# Patient Record
Sex: FEMALE | Race: ASIAN | Employment: OTHER | URBAN - METROPOLITAN AREA
[De-identification: names, ages, dates, MRNs, and addresses within clinical notes are randomized per-mention and may not be internally consistent; named-entity substitution may affect disease eponyms.]

---

## 2017-01-13 ENCOUNTER — TELEPHONE (OUTPATIENT)
Dept: FAMILY MEDICINE CLINIC | Facility: CLINIC | Age: 62
End: 2017-01-13

## 2017-01-13 NOTE — TELEPHONE ENCOUNTER
Patient called and would like to see Dr Ny Scales as patient now has insurance. Patient stated she hasn't been here since 9/2016 due to being self pay. Patient would like to speak with nurse before scheduling her appt for med refills. Please contact patient.

## 2017-01-16 ENCOUNTER — TELEPHONE (OUTPATIENT)
Dept: FAMILY MEDICINE CLINIC | Facility: CLINIC | Age: 62
End: 2017-01-16

## 2017-01-16 NOTE — TELEPHONE ENCOUNTER
Patient called and left message on call, called patient and spoke to her, states would like to make an appointment as she has new insurance and would like to get her labs done before her appointment. Advised to call the office on Monday.

## 2017-01-16 NOTE — TELEPHONE ENCOUNTER
Patient had labs ordered at [facility] cancelled can reorder when we  know insurance. Patient has appointment with new PCP that new PCP can order any labs wanted.      Future Appointments  Date Time Provider Clifton Burch   1/16/2017 3:30 PM BRIANNA Alonzo

## 2017-01-16 NOTE — TELEPHONE ENCOUNTER
Pt said she spoke to Dr Jarred Martel and was advised to speak with a nurse to get blood work order. [facility]. (Changed listing in chart). Pls call pt and confirm order.

## 2017-01-20 ENCOUNTER — TELEPHONE (OUTPATIENT)
Dept: FAMILY MEDICINE CLINIC | Facility: CLINIC | Age: 62
End: 2017-01-20

## 2017-01-20 DIAGNOSIS — I10 ESSENTIAL HYPERTENSION: ICD-10-CM

## 2017-01-20 DIAGNOSIS — E78.00 PURE HYPERCHOLESTEROLEMIA: Primary | ICD-10-CM

## 2017-01-20 NOTE — TELEPHONE ENCOUNTER
Wants to have lab order put in. Asked to speak to Dr CONTRERAS's nurse - quite insistent. Does not want to come in, wants labs done prior to appt -- I explained labs are normally ordered at the time of the appt. .   Labs at [place], [place].    I did change LAB in pt

## 2017-01-20 NOTE — TELEPHONE ENCOUNTER
Patient called today and LVM asking to speak with nurse, pt did call office twice during meeting hour and LVM both times needs to speak with nurse regarding call she made earlier today.

## 2017-01-20 NOTE — TELEPHONE ENCOUNTER
No future appointments. Future Appointments  Date Time Provider Clifton Kiersten   2/22/2017 11:30 AM Kayce Clayton MD [facility] [address]     Patient aware of labs being ordered.

## 2017-02-05 DIAGNOSIS — I10 ESSENTIAL HYPERTENSION WITH GOAL BLOOD PRESSURE LESS THAN 140/90: Primary | ICD-10-CM

## 2017-02-06 NOTE — TELEPHONE ENCOUNTER
Future Appointments  Return in about 6 months (around 3/27/2017).   Date Time Provider Clifton Kiersten   2/22/2017 11:30 AM Hadley Causey MD [facility] [address]     LOV 9/16     LAST LAB 10/15    LAST RX  Metoprolol Succinate ER 25 MG Oral Tablet 24 Hr

## 2017-02-13 ENCOUNTER — TELEPHONE (OUTPATIENT)
Dept: FAMILY MEDICINE CLINIC | Facility: CLINIC | Age: 62
End: 2017-02-13

## 2017-02-13 NOTE — TELEPHONE ENCOUNTER
Returned patient's call and let her know labs have been ordered and she can go to [facility].  She asked hours of [facility] and I advised her to call them.

## 2017-02-15 LAB
ALBUMIN/GLOBULIN RATIO: 1.3 (CALC) (ref 1–2.5)
ALBUMIN: 4.1 G/DL (ref 3.6–5.1)
ALKALINE PHOSPHATASE: 69 U/L (ref 33–130)
ALT: 18 U/L (ref 6–29)
AST: 19 U/L (ref 10–35)
BILIRUBIN, TOTAL: 0.3 MG/DL (ref 0.2–1.2)
BUN/CREATININE RATIO: 8 (CALC) (ref 6–22)
BUN: 8 MG/DL (ref 7–25)
CALCIUM: 9.6 MG/DL (ref 8.6–10.4)
CARBON DIOXIDE: 26 MMOL/L (ref 20–31)
CHLORIDE: 108 MMOL/L (ref 98–110)
CHOL/HDLC RATIO: 2.5 (CALC)
CHOLESTEROL, TOTAL: 141 MG/DL (ref 125–200)
CREATININE: 1.03 MG/DL (ref 0.5–0.99)
EGFR IF AFRICN AM: 67 ML/MIN/1.73M2
EGFR IF NONAFRICN AM: 58 ML/MIN/1.73M2
GLOBULIN: 3.2 G/DL (CALC) (ref 1.9–3.7)
GLUCOSE: 98 MG/DL (ref 65–99)
HDL CHOLESTEROL: 56 MG/DL
LDL-CHOLESTEROL: 66 MG/DL (CALC)
NON-HDL CHOLESTEROL: 85 MG/DL (CALC)
POTASSIUM: 4.9 MMOL/L (ref 3.5–5.3)
PROTEIN, TOTAL: 7.3 G/DL (ref 6.1–8.1)
SODIUM: 141 MMOL/L (ref 135–146)
TRIGLYCERIDES: 95 MG/DL

## 2017-02-20 ENCOUNTER — OFFICE VISIT (OUTPATIENT)
Dept: FAMILY MEDICINE CLINIC | Facility: CLINIC | Age: 62
End: 2017-02-20

## 2017-02-20 VITALS
TEMPERATURE: 98 F | RESPIRATION RATE: 16 BRPM | BODY MASS INDEX: 24.77 KG/M2 | HEIGHT: 58 IN | DIASTOLIC BLOOD PRESSURE: 72 MMHG | HEART RATE: 88 BPM | SYSTOLIC BLOOD PRESSURE: 118 MMHG | WEIGHT: 118 LBS

## 2017-02-20 DIAGNOSIS — N18.2 CHRONIC KIDNEY DISEASE, STAGE 2 (MILD): ICD-10-CM

## 2017-02-20 DIAGNOSIS — E78.00 PURE HYPERCHOLESTEROLEMIA: ICD-10-CM

## 2017-02-20 DIAGNOSIS — I10 ESSENTIAL HYPERTENSION WITH GOAL BLOOD PRESSURE LESS THAN 140/90: ICD-10-CM

## 2017-02-20 DIAGNOSIS — Z79.899 ENCOUNTER FOR LONG-TERM CURRENT USE OF MEDICATION: Primary | ICD-10-CM

## 2017-02-20 DIAGNOSIS — E55.9 VITAMIN D DEFICIENCY: ICD-10-CM

## 2017-02-20 PROBLEM — N18.9 CHRONIC KIDNEY DISEASE: Status: ACTIVE | Noted: 2017-02-20

## 2017-02-20 PROCEDURE — 99213 OFFICE O/P EST LOW 20 MIN: CPT | Performed by: FAMILY MEDICINE

## 2017-02-20 RX ORDER — LOSARTAN POTASSIUM AND HYDROCHLOROTHIAZIDE 12.5; 1 MG/1; MG/1
1 TABLET ORAL
Qty: 90 TABLET | Refills: 1 | Status: SHIPPED | OUTPATIENT
Start: 2017-02-20 | End: 2017-09-07

## 2017-02-20 RX ORDER — ROSUVASTATIN CALCIUM 10 MG/1
10 TABLET, COATED ORAL NIGHTLY
Qty: 90 TABLET | Refills: 1 | Status: SHIPPED | OUTPATIENT
Start: 2017-02-20 | End: 2017-04-03 | Stop reason: ALTCHOICE

## 2017-02-20 NOTE — PATIENT INSTRUCTIONS
Chronic Kidney Disease (CKD)    The role of the kidneys is to remove waste products and extra water from the blood.  When the kidneys do not work as they should, waste products begin to build up in the blood. This is called chronic kidney disease (CKD). · If you smoke, you must quit. Smoking makes kidney disease worse. Talk with your healthcare provider about ways to help you quit.  For more information, visit the following links:  ¨ www.smokefree.gov/sites/default/files/pdf/clearing-the-air-accessible. pd · Chest pain or shortness of breath  · Heart beating fast, slow, or irregularly  When to seek medical advice  Call your healthcare provider right away if any of these occur:  · Nausea or vomiting  · Fever of 100.4°F (38°C) or higher, or as directed by your

## 2017-02-27 NOTE — PROGRESS NOTES
Sara Pollard is a [place] 43year old female. Patient presents with: Follow - Up: Lab results. HPI:   HTN: patient is seen for follow up and medication refill, states has been compliant with low salt diet and medication.  Denies dizziness, blurred vision, palp edema  Component      Latest Ref Rng 2/14/2017   GLUCOSE      65 - 99 mg/dL 98   BUN      7 - 25 mg/dL 8   CREATININE      0.50 - 0.99 mg/dL 1.03 (H)   eGFR NON-AFR.  AMERICAN      > OR = 60 mL/min/1.73m2 58 (L)   EGFR IF AFRICN AM      > OR = 60 mL/min/1.7

## 2017-03-27 ENCOUNTER — TELEPHONE (OUTPATIENT)
Dept: FAMILY MEDICINE CLINIC | Facility: CLINIC | Age: 62
End: 2017-03-27

## 2017-03-27 DIAGNOSIS — E78.00 PURE HYPERCHOLESTEROLEMIA: Primary | ICD-10-CM

## 2017-04-03 RX ORDER — ATORVASTATIN CALCIUM 10 MG/1
10 TABLET, FILM COATED ORAL NIGHTLY
Qty: 90 TABLET | Refills: 0 | Status: SHIPPED | OUTPATIENT
Start: 2017-04-03 | End: 2017-04-03

## 2017-04-03 NOTE — TELEPHONE ENCOUNTER
Patient called asking to speak with Dr Lashawn Holbrook in regards to her Crestor. I explained that she's seeing patients today. She said she must speak with her because she speaks her language.   I suggested she go through the triage nurse first.  Transferred to

## 2017-04-03 NOTE — TELEPHONE ENCOUNTER
Rx sent. Spoke to patient she says she had muscle pains with another Rx. But cant remember. She will ask her spouse.     2014 Hyperlipidemia: patient states since she was changed from crestor to atorvastatin has pain in her left shoulder back and chest area

## 2017-04-03 NOTE — TELEPHONE ENCOUNTER
Prior auth for generic crestor denied by [employer], patient states was told by then we can do prior auth for the brand.

## 2017-04-04 NOTE — TELEPHONE ENCOUNTER
Patient called wants her Rx doesn't want to wait any longer . Advised can pay out of pocket and if approved can be reimbursed. Says she does not want to pay.

## 2017-04-10 ENCOUNTER — TELEPHONE (OUTPATIENT)
Dept: FAMILY MEDICINE CLINIC | Facility: CLINIC | Age: 62
End: 2017-04-10

## 2017-04-10 DIAGNOSIS — I10 ESSENTIAL HYPERTENSION WITH GOAL BLOOD PRESSURE LESS THAN 140/90: Primary | ICD-10-CM

## 2017-04-10 RX ORDER — ROSUVASTATIN CALCIUM 10 MG/1
10 TABLET, FILM COATED ORAL NIGHTLY
Qty: 30 TABLET | Refills: 2 | Status: SHIPPED | OUTPATIENT
Start: 2017-04-10 | End: 2017-09-07

## 2017-04-10 NOTE — TELEPHONE ENCOUNTER
Pharmacy is still giving her Generic of Cestor and she cannot take that. Sent to Triage . Asked to speak with Dr Mendez Albert.

## 2017-09-07 ENCOUNTER — OFFICE VISIT (OUTPATIENT)
Dept: FAMILY MEDICINE CLINIC | Facility: CLINIC | Age: 62
End: 2017-09-07

## 2017-09-07 VITALS
RESPIRATION RATE: 14 BRPM | HEIGHT: 58.5 IN | DIASTOLIC BLOOD PRESSURE: 64 MMHG | SYSTOLIC BLOOD PRESSURE: 118 MMHG | WEIGHT: 120.25 LBS | BODY MASS INDEX: 24.57 KG/M2 | OXYGEN SATURATION: 99 % | HEART RATE: 58 BPM | TEMPERATURE: 98 F

## 2017-09-07 DIAGNOSIS — I10 ESSENTIAL HYPERTENSION WITH GOAL BLOOD PRESSURE LESS THAN 140/90: ICD-10-CM

## 2017-09-07 DIAGNOSIS — Z79.899 ENCOUNTER FOR LONG-TERM (CURRENT) USE OF MEDICATIONS: Primary | ICD-10-CM

## 2017-09-07 DIAGNOSIS — E78.00 PURE HYPERCHOLESTEROLEMIA: ICD-10-CM

## 2017-09-07 PROCEDURE — 99213 OFFICE O/P EST LOW 20 MIN: CPT | Performed by: FAMILY MEDICINE

## 2017-09-07 RX ORDER — LOSARTAN POTASSIUM AND HYDROCHLOROTHIAZIDE 12.5; 1 MG/1; MG/1
1 TABLET ORAL
Qty: 90 TABLET | Refills: 1 | Status: SHIPPED | OUTPATIENT
Start: 2017-09-07 | End: 2018-02-26

## 2017-09-07 RX ORDER — ROSUVASTATIN CALCIUM 10 MG/1
10 TABLET, FILM COATED ORAL NIGHTLY
Qty: 90 TABLET | Refills: 1 | Status: SHIPPED | OUTPATIENT
Start: 2017-09-07 | End: 2018-02-26

## 2017-09-07 NOTE — PROGRESS NOTES
Zi Pace is a 60year old female. Patient presents with:  Medication Follow-Up: 3 month f/u. HPI:   HTN: patient is seen for follow up and medication refill, states has been compliant with low salt diet and medication.  Denies dizziness, bl adenopathy,no bruits  LUNGS: clear to auscultation  CARDIO: RRR without murmur  GI: good BS's,no masses, HSM or tenderness  EXTREMITIES: no cyanosis, clubbing or edema    Component      Latest Ref Rng & Units 2/14/2017   GLUCOSE      65 - 99 mg/dL 98   BUN nightly. -     LIPID PANEL; Future  -     LIPID PANEL  Encouraged to continue low cholesterol diet.

## 2017-11-10 ENCOUNTER — TELEPHONE (OUTPATIENT)
Dept: FAMILY MEDICINE CLINIC | Facility: CLINIC | Age: 62
End: 2017-11-10

## 2017-11-10 NOTE — TELEPHONE ENCOUNTER
**LEFT VM AT FRONT OFFICE**    Left message that she has questions regarding labs and might need to see Dr Shaji Dupree. Pls call.

## 2017-11-22 ENCOUNTER — OFFICE VISIT (OUTPATIENT)
Dept: FAMILY MEDICINE CLINIC | Facility: CLINIC | Age: 62
End: 2017-11-22

## 2017-11-22 VITALS
SYSTOLIC BLOOD PRESSURE: 108 MMHG | BODY MASS INDEX: 24.42 KG/M2 | WEIGHT: 121.13 LBS | HEIGHT: 59 IN | TEMPERATURE: 98 F | RESPIRATION RATE: 16 BRPM | HEART RATE: 64 BPM | OXYGEN SATURATION: 98 % | DIASTOLIC BLOOD PRESSURE: 62 MMHG

## 2017-11-22 DIAGNOSIS — E78.00 PURE HYPERCHOLESTEROLEMIA: ICD-10-CM

## 2017-11-22 DIAGNOSIS — R73.01 IMPAIRED FASTING GLUCOSE: Primary | ICD-10-CM

## 2017-11-22 DIAGNOSIS — I12.9 HYPERTENSIVE CKD (CHRONIC KIDNEY DISEASE): ICD-10-CM

## 2017-11-22 DIAGNOSIS — I10 ESSENTIAL HYPERTENSION: ICD-10-CM

## 2017-11-22 PROCEDURE — 99213 OFFICE O/P EST LOW 20 MIN: CPT | Performed by: FAMILY MEDICINE

## 2017-11-22 NOTE — PATIENT INSTRUCTIONS
Please shelley for a refill in 3 months and I will see you for a follow up in march      Understanding Carbohydrates    A car needs the right type of fuel to run. And you need the right kind of food to function.  To keep your energy level up, your body needs fo around 45 to 60 grams of carbohydrate per meal, depending on your situation. Carb counting is a system that helps you keep track of the carbohydrates you eat at each meal.  Carbohydrates come from a variety of foods.  These include grains, starchy vegetable and total carbohydrates to find the products that work best for you.   · Don't forget protein and fat. With all the focus on carb counting, it might be easy to forget protein and fat in your meals.  Don't forget to include sources of protein and healthy fat

## 2017-11-22 NOTE — PROGRESS NOTES
Milton Naidu is a 60year old female. Patient presents with:  Test Results: Discuss tests results. HPI:   Patient is seen for follow up and to discuss her labs. Would like an updated note for work with restrictions.  patient states has back a Latest Ref Rng & Units 11/14/2017   GLUCOSE      65 - 99 mg/dL 100 (H)   BUN      7 - 25 mg/dL 11   CREATININE      0.50 - 0.99 mg/dL 1.05 (H)   eGFR NON-AFR.  AMERICAN      > OR = 60 mL/min/1.73m2 56 (L)   EGFR IF AFRICN AM      > OR = 60 mL/min/1.73m2 65

## 2017-12-29 NOTE — LETTER
"              After Visit Summary   2017    Neela Michel    MRN: [id number]           Patient Information     Date Of Birth          1994        Visit Information        Provider Department      2017 10:30 AM NA FP NURSE [facility]        Today's Diagnoses     Encounter for other contraceptive management           Follow-ups after your visit        Who to contact     If you have questions or need follow up information about today's clinic visit or your schedule please contact [facility] directly at [phone].  Normal or non-critical lab and imaging results will be communicated to you by [employer], letter or phone within 4 business days after the clinic has received the results. If you do not hear from us within 7 days, please contact the clinic through [employer] or phone. If you have a critical or abnormal lab result, we will notify you by phone as soon as possible.  Submit refill requests through [employer] or call your pharmacy and they will forward the refill request to us. Please allow 3 business days for your refill to be completed.          Additional Information About Your Visit        MyChart Information     [employer] lets you send messages to your doctor, view your test results, renew your prescriptions, schedule appointments and more. To sign up, go to [email or web address] . Click on \"Log in\" on the left side of the screen, which will take you to the Welcome page. Then click on \"Sign up Now\" on the right side of the page.     You will be asked to enter the access code listed below, as well as some personal information. Please follow the directions to create your username and password.     Your access code is: [email or web address]  Expires: 1/3/2018  2:42 PM     Your access code will  in 90 days. If you need help or a new code, please call your [facility] or [phone].        Care EveryWhere ID     This is your Care EveryWhere ID. This could be used by other " 04/05/18        [address] [place] [address],[address]  [facility] [address]      Dear Andrew ,    Our records indicate that you have outstanding lab work and or testing that was ordered for you and has not yet been completed:          BASIC METABOLIC PANEL [062 organizations to access your [place] medical records  [id number]         Blood Pressure from Last 3 Encounters:   10/18/16 94/62   03/22/16 114/84   07/31/15 108/74    Weight from Last 3 Encounters:   10/18/16 133 lb (60.3 kg)   03/22/16 126 lb (57.2 kg)   07/31/15 128 lb 6.4 oz (58.2 kg)              We Performed the Following     THER/PROPH/DIAG INJ, SC/IM          Where to get your medicines      Some of these will need a paper prescription and others can be bought over the counter.  Ask your nurse if you have questions.     You don't need a prescription for these medications     medroxyPROGESTERone 150 MG/ML injection          Primary Care Provider Office Phone # Fax #    CANDY Sinclair [phone]       [facility] [place] [address]  [place] [address]        Equal Access to Services     GRICEL LOPEZ : Hadii analia ku hadasho Soomaali, waaxda luqadaha, qaybta kaalmada adeegyada, claire zamudio . So [facility] [phone].    ATENCIÓN: Si habla español, tiene a osuna disposición servicios gratuitos de asistencia lingüística. Llmiguel al [phone].    We comply with applicable federal civil rights laws and [place] laws. We do not discriminate on the basis of race, color, national origin, age, disability, sex, sexual orientation, or gender identity.            Thank you!     Thank you for choosing [facility]  for your care. Our goal is always to provide you with excellent care. Hearing back from our patients is one way we can continue to improve our services. Please take a few minutes to complete the written survey that you may receive in the mail after your visit with us. Thank you!             Your Updated Medication List - Protect others around you: Learn how to safely use, store and throw away your medicines at www.disposemymeds.org.          This list is accurate as of: 12/29/17 11:09 AM.  Always use your most recent med list.                    Brand Name Dispense Instructions for use Diagnosis    IBUPROFEN PO      Take 400 mg by mouth every 8 hours as needed for moderate pain        loratadine 10 MG tablet    CLARITIN    30 tablet    Take 1 tablet (10 mg) by mouth daily    Seasonal allergic rhinitis, unspecified allergic rhinitis trigger       medroxyPROGESTERone 150 MG/ML injection    DEPO-PROVERA    0.9 mL    Inject 1 mL (150 mg) into the muscle every 3 months    Encounter for other contraceptive management

## 2018-02-26 DIAGNOSIS — I10 ESSENTIAL HYPERTENSION WITH GOAL BLOOD PRESSURE LESS THAN 140/90: ICD-10-CM

## 2018-02-26 DIAGNOSIS — E78.00 PURE HYPERCHOLESTEROLEMIA: ICD-10-CM

## 2018-02-26 RX ORDER — LOSARTAN POTASSIUM AND HYDROCHLOROTHIAZIDE 12.5; 1 MG/1; MG/1
TABLET ORAL
Qty: 30 TABLET | Refills: 1 | Status: SHIPPED | OUTPATIENT
Start: 2018-02-26 | End: 2018-03-06

## 2018-02-26 RX ORDER — ROSUVASTATIN CALCIUM 10 MG/1
TABLET, FILM COATED ORAL
Qty: 90 TABLET | Refills: 1 | Status: SHIPPED | OUTPATIENT
Start: 2018-02-26 | End: 2018-03-06

## 2018-02-26 NOTE — TELEPHONE ENCOUNTER
Future Appointments  Date Time Provider Clifton Kiersten   4/11/2018 12:00 PM Bert Valerio MD [facility] [address]       LOV 11/17    LAST LAB 11/17    LAST RX   Losartan Potassium-HCTZ 100-12.5 MG Oral Tab 90 tablet 1 9/7/2017       PROTOCOL    David

## 2018-03-02 LAB
ALBUMIN/GLOBULIN RATIO: 1.5 (CALC) (ref 1–2.5)
ALBUMIN: 4.2 G/DL (ref 3.6–5.1)
ALKALINE PHOSPHATASE: 75 U/L (ref 33–130)
ALT: 18 U/L (ref 6–29)
AST: 20 U/L (ref 10–35)
BILIRUBIN, TOTAL: 0.6 MG/DL (ref 0.2–1.2)
BUN/CREATININE RATIO: 16 (CALC) (ref 6–22)
BUN: 19 MG/DL (ref 7–25)
CALCIUM: 9.4 MG/DL (ref 8.6–10.4)
CARBON DIOXIDE: 26 MMOL/L (ref 20–31)
CHLORIDE: 91 MMOL/L (ref 98–110)
CHOL/HDLC RATIO: 3 (CALC)
CHOLESTEROL, TOTAL: 158 MG/DL
CREATININE: 1.17 MG/DL (ref 0.5–0.99)
EGFR IF AFRICN AM: 57 ML/MIN/1.73M2
EGFR IF NONAFRICN AM: 49 ML/MIN/1.73M2
GLOBULIN: 2.8 G/DL (CALC) (ref 1.9–3.7)
GLUCOSE: 99 MG/DL (ref 65–99)
HDL CHOLESTEROL: 53 MG/DL
HEMOGLOBIN A1C: 6 % OF TOTAL HGB
LDL-CHOLESTEROL: 79 MG/DL (CALC)
NON-HDL CHOLESTEROL: 105 MG/DL (CALC)
POTASSIUM: 4.7 MMOL/L (ref 3.5–5.3)
PROTEIN, TOTAL: 7 G/DL (ref 6.1–8.1)
SODIUM: 125 MMOL/L (ref 135–146)
TRIGLYCERIDES: 154 MG/DL

## 2018-03-06 ENCOUNTER — OFFICE VISIT (OUTPATIENT)
Dept: FAMILY MEDICINE CLINIC | Facility: CLINIC | Age: 63
End: 2018-03-06

## 2018-03-06 ENCOUNTER — TELEPHONE (OUTPATIENT)
Dept: FAMILY MEDICINE CLINIC | Facility: CLINIC | Age: 63
End: 2018-03-06

## 2018-03-06 VITALS
HEART RATE: 56 BPM | DIASTOLIC BLOOD PRESSURE: 74 MMHG | RESPIRATION RATE: 16 BRPM | HEIGHT: 58 IN | TEMPERATURE: 98 F | BODY MASS INDEX: 25.3 KG/M2 | SYSTOLIC BLOOD PRESSURE: 132 MMHG | WEIGHT: 120.5 LBS | OXYGEN SATURATION: 98 %

## 2018-03-06 DIAGNOSIS — N18.2 HYPERTENSIVE KIDNEY DISEASE WITH STAGE 2 CHRONIC KIDNEY DISEASE: ICD-10-CM

## 2018-03-06 DIAGNOSIS — R00.1 BRADYCARDIA: ICD-10-CM

## 2018-03-06 DIAGNOSIS — Z79.899 ENCOUNTER FOR LONG-TERM (CURRENT) USE OF MEDICATIONS: Primary | ICD-10-CM

## 2018-03-06 DIAGNOSIS — I10 ESSENTIAL HYPERTENSION: ICD-10-CM

## 2018-03-06 DIAGNOSIS — E78.00 PURE HYPERCHOLESTEROLEMIA: ICD-10-CM

## 2018-03-06 DIAGNOSIS — E87.1 HYPONATREMIA: ICD-10-CM

## 2018-03-06 DIAGNOSIS — R00.2 PALPITATIONS: ICD-10-CM

## 2018-03-06 DIAGNOSIS — R73.03 PREDIABETES: ICD-10-CM

## 2018-03-06 DIAGNOSIS — I12.9 HYPERTENSIVE KIDNEY DISEASE WITH STAGE 2 CHRONIC KIDNEY DISEASE: ICD-10-CM

## 2018-03-06 PROCEDURE — 99214 OFFICE O/P EST MOD 30 MIN: CPT | Performed by: FAMILY MEDICINE

## 2018-03-06 PROCEDURE — 93000 ELECTROCARDIOGRAM COMPLETE: CPT | Performed by: FAMILY MEDICINE

## 2018-03-06 RX ORDER — LOSARTAN POTASSIUM AND HYDROCHLOROTHIAZIDE 12.5; 1 MG/1; MG/1
1 TABLET ORAL
Qty: 90 TABLET | Refills: 1 | Status: SHIPPED | OUTPATIENT
Start: 2018-03-06 | End: 2018-07-11

## 2018-03-06 RX ORDER — ROSUVASTATIN CALCIUM 10 MG/1
TABLET, FILM COATED ORAL
Qty: 90 TABLET | Refills: 1 | Status: SHIPPED | OUTPATIENT
Start: 2018-03-06 | End: 2018-07-11

## 2018-03-06 NOTE — PATIENT INSTRUCTIONS
Please get the ECHO done  Continue to monitor your blood pressure at home. If you continue to have palpitations with symptoms will order a Holter or refer you to cardiology.     Please make sure you drink water before your next blood test.    Sodium and ch · Cut back on how much salt you get in your diet. Here’s how to do this:  ¨ Don’t eat foods that have a lot of salt. These include olives, pickles, smoked meats, and salted potato chips. ¨ Don’t add salt to your food at the table.   ¨ Use only small amount The American Heart Association recommends the following guidelines for home blood pressure monitoring:  · Don't smoke or drink coffee for 30 minutes before taking your blood pressure.   · Go to the bathroom before the test.  · Relax for 5 minutes before heather · Throbbing or rushing sound in the ears  · Nosebleed  · Sudden severe pain in your belly (abdomen)  · Extreme drowsiness, confusion, or fainting  · Dizziness or spinning sensation (vertigo)  · Weakness of an arm or leg or one side of the face  · You have

## 2018-03-06 NOTE — PROGRESS NOTES
Richard Pickens is a 58year old female. Patient presents with:  Test Results: Patient here to discuss labs    HPI:   HTN: Patient is seen for follow-up and medication refill, compliant with medication.   States blood pressure has been running high 14 Smokeless tobacco: Never Used                      Alcohol use:  No                 REVIEW OF SYSTEMS:   GENERAL HEALTH: denies fatigue  SKIN: denies any unusual skin lesions or rashes  RESPIRATORY: denies shortness of breath with e % of total Hgb 6.0 (H)     ASSESSMENT AND PLAN:   Diagnoses and all orders for this visit:    Encounter for long-term (current) use of medications    Pure hypercholesterolemia  -     CRESTOR 10 MG Oral Tab; TAKE ONE TABLET BY MOUTH NIGHTLY  -     LIPID PAN

## 2018-03-08 NOTE — TELEPHONE ENCOUNTER
**PT LVM AT FRONT OFFICE**    Stated that her [employer] is telling her she needs Prior Auth for medication and she would like a Nurse to call her.     [phone]

## 2018-03-12 ENCOUNTER — HOSPITAL ENCOUNTER (OUTPATIENT)
Dept: CV DIAGNOSTICS | Facility: HOSPITAL | Age: 63
Discharge: HOME | End: 2018-03-12
Attending: FAMILY MEDICINE
Payer: COMMERCIAL

## 2018-03-12 ENCOUNTER — TELEPHONE (OUTPATIENT)
Dept: FAMILY MEDICINE CLINIC | Facility: CLINIC | Age: 63
End: 2018-03-12

## 2018-03-12 DIAGNOSIS — R00.1 BRADYCARDIA: ICD-10-CM

## 2018-03-12 DIAGNOSIS — R00.2 PALPITATIONS: ICD-10-CM

## 2018-03-12 NOTE — TELEPHONE ENCOUNTER
Patient needs Crestor/brand. Due to feet and back pain on generic.      Prior auth done wait for approval or denial.

## 2018-03-16 NOTE — TELEPHONE ENCOUNTER
Pt called back stating [facility] is telling her the the price is $110    Advised to call her insurance to see what the Preferred pharmacy is, as it may be cheaper

## 2018-04-04 ENCOUNTER — HOSPITAL ENCOUNTER (OUTPATIENT)
Dept: CV DIAGNOSTICS | Facility: HOSPITAL | Age: 63
Discharge: HOME | End: 2018-04-04
Attending: FAMILY MEDICINE
Payer: COMMERCIAL

## 2018-04-04 PROCEDURE — 93306 TTE W/DOPPLER COMPLETE: CPT | Performed by: FAMILY MEDICINE

## 2018-06-21 ENCOUNTER — TELEPHONE (OUTPATIENT)
Dept: FAMILY MEDICINE CLINIC | Facility: CLINIC | Age: 63
End: 2018-06-21

## 2018-06-21 NOTE — TELEPHONE ENCOUNTER
Has a medication question. No other details. Asked to speak with Dr Gloria Chew directly. Explained she needs to leave a message with the Triage Nurse.

## 2018-06-21 NOTE — TELEPHONE ENCOUNTER
Called and spoke to patient, states will not have insurance after July and will not have insurance until January. Advised to make an appointment in July to discuss refills.

## 2018-06-21 NOTE — TELEPHONE ENCOUNTER
Spoke with pt, she is being laid off at [employer] and is losing her insurance. Pt has one more 90 day Rx on her medications that she was able to refill but is worried about what she can do now.     Pt wanted to know if she should do labs agan while she has i

## 2018-07-11 ENCOUNTER — OFFICE VISIT (OUTPATIENT)
Dept: FAMILY MEDICINE CLINIC | Facility: CLINIC | Age: 63
End: 2018-07-11

## 2018-07-11 VITALS
SYSTOLIC BLOOD PRESSURE: 122 MMHG | DIASTOLIC BLOOD PRESSURE: 74 MMHG | HEART RATE: 68 BPM | WEIGHT: 121.13 LBS | TEMPERATURE: 98 F | BODY MASS INDEX: 24.75 KG/M2 | RESPIRATION RATE: 16 BRPM | HEIGHT: 58.5 IN

## 2018-07-11 DIAGNOSIS — G89.29 CHRONIC PAIN OF RIGHT KNEE: ICD-10-CM

## 2018-07-11 DIAGNOSIS — I10 ESSENTIAL HYPERTENSION: ICD-10-CM

## 2018-07-11 DIAGNOSIS — Z79.899 ENCOUNTER FOR LONG-TERM CURRENT USE OF MEDICATION: Primary | ICD-10-CM

## 2018-07-11 DIAGNOSIS — R00.2 PALPITATIONS: ICD-10-CM

## 2018-07-11 DIAGNOSIS — E78.00 PURE HYPERCHOLESTEROLEMIA: ICD-10-CM

## 2018-07-11 DIAGNOSIS — M25.561 CHRONIC PAIN OF RIGHT KNEE: ICD-10-CM

## 2018-07-11 PROCEDURE — 99214 OFFICE O/P EST MOD 30 MIN: CPT | Performed by: FAMILY MEDICINE

## 2018-07-11 RX ORDER — LOSARTAN POTASSIUM AND HYDROCHLOROTHIAZIDE 12.5; 1 MG/1; MG/1
1 TABLET ORAL
Qty: 90 TABLET | Refills: 1 | Status: SHIPPED | OUTPATIENT
Start: 2018-07-11 | End: 2018-11-07

## 2018-07-11 RX ORDER — ROSUVASTATIN CALCIUM 10 MG/1
TABLET, FILM COATED ORAL
Qty: 90 TABLET | Refills: 1 | Status: SHIPPED | OUTPATIENT
Start: 2018-07-11 | End: 2019-01-07

## 2018-07-23 NOTE — PROGRESS NOTES
Iona Vasquez is a 58year old female. Patient presents with: Follow - Up: Pt here to see provider before she loses her insurance. HPI:   HTN: Patient is seen for follow-up and medication refill, compliant with medication and low salt diet.  Alvino Avilez are clear  NECK: supple,no adenopathy,no bruits  LUNGS: clear to auscultation  CARDIO: RRR without murmur  KNEE: right, no swelling, no tenderness to palpation along the joint line, mild crepitus, normal ROM  EXTREMITIES: no cyanosis, clubbing or edema

## 2018-09-04 DIAGNOSIS — R00.2 PALPITATIONS: ICD-10-CM

## 2018-09-04 DIAGNOSIS — E78.00 PURE HYPERCHOLESTEROLEMIA: ICD-10-CM

## 2018-09-04 DIAGNOSIS — I10 ESSENTIAL HYPERTENSION: ICD-10-CM

## 2018-09-05 RX ORDER — LOSARTAN POTASSIUM AND HYDROCHLOROTHIAZIDE 12.5; 1 MG/1; MG/1
1 TABLET ORAL
Qty: 90 TABLET | Refills: 1 | OUTPATIENT
Start: 2018-09-05

## 2018-09-05 RX ORDER — ROSUVASTATIN CALCIUM 10 MG/1
TABLET, FILM COATED ORAL
Qty: 90 TABLET | Refills: 1 | OUTPATIENT
Start: 2018-09-05

## 2018-09-05 NOTE — TELEPHONE ENCOUNTER
Losartan Potassium-HCTZ 100-12.5 MG Oral Tab  Take 1 tablet by mouth once daily.        Disp: 90 tablet Refills: 1    Class: Normal Start: 9/4/2018   For: Essential hypertension  Originally ordered: 3 years ago by Reji Carvajal MD  Hypertension Medicati

## 2018-09-13 NOTE — TELEPHONE ENCOUNTER
[facility] mail service pharmacy called to verify patient's prescriptions as listed below:  Losartan  Crestor  Metprolol    Patient is requesting further Rx be sent to mail order pharmacy.

## 2018-09-24 DIAGNOSIS — E78.00 PURE HYPERCHOLESTEROLEMIA: ICD-10-CM

## 2018-09-24 DIAGNOSIS — I10 ESSENTIAL HYPERTENSION: ICD-10-CM

## 2018-09-24 DIAGNOSIS — R00.2 PALPITATIONS: ICD-10-CM

## 2018-09-25 RX ORDER — LOSARTAN POTASSIUM AND HYDROCHLOROTHIAZIDE 12.5; 1 MG/1; MG/1
TABLET ORAL
Qty: 30 TABLET | Refills: 0 | OUTPATIENT
Start: 2018-09-25

## 2018-09-25 RX ORDER — ROSUVASTATIN CALCIUM 10 MG/1
TABLET, COATED ORAL
Qty: 30 TABLET | Refills: 0 | OUTPATIENT
Start: 2018-09-25

## 2018-09-25 NOTE — TELEPHONE ENCOUNTER
Name from pharmacy: Losartan Potassium-HCTZ Oral Tablet 100-12.5 MG          Will file in chart as: LOSARTAN POTASSIUM-HCTZ 100-12.5 MG Oral Tab    The source prescription was reordered on 7/11/2018 by Skip Oliveira MD.    Sig: TAKE ONE TABLET BY DOROTHY TAKE ONE TABLET BY MOUTH ONE TIME DAILY     Disp:  30 tablet (Pharmacy requested: 30)    Refills:  0    Start: 9/24/2018     Class: Normal    For: Palpitations    Requested on: 3/6/2018    Last ordered: 6 months ago by Tory Forrester MD Last refill: 8/2

## 2018-10-30 ENCOUNTER — TELEPHONE (OUTPATIENT)
Dept: FAMILY MEDICINE CLINIC | Facility: CLINIC | Age: 63
End: 2018-10-30

## 2018-10-30 NOTE — TELEPHONE ENCOUNTER
Patient called and stated Spencer Camacho would like to speak with Dr Bessie Cabrera. in her language\". Declined to schedule an Appt. Appt offered. Said \"Dr CONTRERAS told her to call with questions and she would call her back\".

## 2018-10-31 NOTE — TELEPHONE ENCOUNTER
Called and spoke to patient, states has a cold for 2 days, will be travelling in November, does no have insurance and wants to know if she can still be seen if not better. Told patient will be charged as self pay and can absolutely be seen.   Will call for

## 2018-11-07 ENCOUNTER — TELEPHONE (OUTPATIENT)
Dept: FAMILY MEDICINE CLINIC | Facility: CLINIC | Age: 63
End: 2018-11-07

## 2018-11-07 DIAGNOSIS — I10 ESSENTIAL HYPERTENSION: ICD-10-CM

## 2018-11-07 RX ORDER — LOSARTAN POTASSIUM AND HYDROCHLOROTHIAZIDE 12.5; 1 MG/1; MG/1
1 TABLET ORAL
Qty: 90 TABLET | Refills: 1 | Status: CANCELLED | OUTPATIENT
Start: 2018-11-07

## 2018-11-07 RX ORDER — LOSARTAN POTASSIUM AND HYDROCHLOROTHIAZIDE 12.5; 1 MG/1; MG/1
1 TABLET ORAL
Qty: 30 TABLET | Refills: 0 | Status: SHIPPED | OUTPATIENT
Start: 2018-11-07 | End: 2018-11-09

## 2018-11-07 NOTE — TELEPHONE ENCOUNTER
Patient says her mail order says the script was sent as Losartan 100/25 mg but that did not come from PCP office. Says she cant afford [place] script and wants a letter. Says she has no insurance now. Needs Rx as going to [place] in 2 days.      Wants to clyde

## 2018-11-07 NOTE — TELEPHONE ENCOUNTER
Patient called is questioning her Losartan dose. Says she got Losartan 100/25 mg is on 12.5.     Potassium-HCTZ (HYZAAR) 100-25 MG Oral Tab (Discontinued)  4 8/11/2014 9/     Losartan Potassium-HCTZ 100-12.5 MG Oral Tab 90 tablet 1 7/11/2018     HUANG

## 2018-11-09 RX ORDER — LOSARTAN POTASSIUM AND HYDROCHLOROTHIAZIDE 12.5; 1 MG/1; MG/1
1 TABLET ORAL
Qty: 90 TABLET | Refills: 1 | Status: SHIPPED | OUTPATIENT
Start: 2018-11-09 | End: 2019-02-08

## 2018-11-09 NOTE — TELEPHONE ENCOUNTER
[facility]. There was a miscommunication on original order for Losartan Potassium-HCTZ. The dose was entered as 100/25 instead of 100/12. 5. Corrected Rx sent to [facility] and they said they would replace with correct dose.     Patient notif

## 2018-11-09 NOTE — TELEPHONE ENCOUNTER
I'm not sure mail order will cover her prescription as she does not have insurance, cannot send a 90 day prescription as she has not been seen for a while. She should have had another refill from her July prescription.  She is not due for refill until [facility]

## 2018-11-14 ENCOUNTER — TELEPHONE (OUTPATIENT)
Dept: FAMILY MEDICINE CLINIC | Facility: CLINIC | Age: 63
End: 2018-11-14

## 2018-11-14 NOTE — TELEPHONE ENCOUNTER
Spoke to patient she does not want different RX at this time. Advised that if needed can see a dr in [facility] or see her PCP when back.

## 2018-11-14 NOTE — TELEPHONE ENCOUNTER
Patient called and states she still has not received the updated Rx for Losartan-HCTZ 100-12.5 mg from [facility]. Called [facility]- Unable to verify where the error was made, on the office side or on the Pharmacy side since the Rx was called in on 9/13/18.

## 2018-11-14 NOTE — TELEPHONE ENCOUNTER
Lisinopril HCTZ 20-12.5 is on the $4 list, I usually do not like to change medication if the patient is travelling, please advise patient to come in and see me to discuss her medication, it will be a self pay appointment.

## 2019-01-07 ENCOUNTER — OFFICE VISIT (OUTPATIENT)
Dept: FAMILY MEDICINE CLINIC | Facility: CLINIC | Age: 64
End: 2019-01-07
Payer: MEDICARE

## 2019-01-07 VITALS
BODY MASS INDEX: 24 KG/M2 | SYSTOLIC BLOOD PRESSURE: 118 MMHG | DIASTOLIC BLOOD PRESSURE: 62 MMHG | OXYGEN SATURATION: 98 % | HEART RATE: 64 BPM | WEIGHT: 117.25 LBS | RESPIRATION RATE: 16 BRPM | TEMPERATURE: 98 F

## 2019-01-07 DIAGNOSIS — J40 BRONCHITIS: ICD-10-CM

## 2019-01-07 DIAGNOSIS — R00.2 PALPITATIONS: ICD-10-CM

## 2019-01-07 DIAGNOSIS — I10 ESSENTIAL HYPERTENSION: ICD-10-CM

## 2019-01-07 DIAGNOSIS — E78.00 PURE HYPERCHOLESTEROLEMIA: ICD-10-CM

## 2019-01-07 DIAGNOSIS — J06.9 ACUTE URI: Primary | ICD-10-CM

## 2019-01-07 PROCEDURE — 99213 OFFICE O/P EST LOW 20 MIN: CPT | Performed by: FAMILY MEDICINE

## 2019-01-07 RX ORDER — ROSUVASTATIN CALCIUM 10 MG/1
TABLET, FILM COATED ORAL
Qty: 90 TABLET | Refills: 0 | Status: SHIPPED | OUTPATIENT
Start: 2019-01-07 | End: 2019-02-06

## 2019-01-07 RX ORDER — BENZONATATE 200 MG/1
200 CAPSULE ORAL 3 TIMES DAILY PRN
Qty: 21 CAPSULE | Refills: 0 | Status: SHIPPED | OUTPATIENT
Start: 2019-01-07 | End: 2019-01-14

## 2019-01-07 NOTE — PATIENT INSTRUCTIONS
mucinex 600 mg 2 times daily      Acute Bronchitis  Your healthcare provider has told you that you have acute bronchitis. Bronchitis is infection or inflammation of the bronchial tubes (airways in the lungs).  Normally, air moves easily in and out of the ai infections. · Drink plenty of fluids, such as water, juice, or warm soup. Fluids loosen mucus so that you can cough it up. This helps you breathe more easily. Fluids also prevent dehydration. · Make sure you get plenty of rest.  · Do not smoke.  Do not al

## 2019-01-07 NOTE — PROGRESS NOTES
Serenity Gar is a 71year old female. Patient presents with:  Cough:  One week with cold and cough    HPI:   Patient complaining of URI symptoms for the past 1 week, states nasal congestion and sore throat are better and fever has resolved but cont good BS's,no masses, HSM or tenderness  EXTREMITIES: no cyanosis, clubbing or edema    ASSESSMENT AND PLAN:   Gloria was seen today for cough. Diagnoses and all orders for this visit:    Acute URI  Comfort care discussed with patient.     Bronchitis  -

## 2019-02-06 DIAGNOSIS — I10 ESSENTIAL HYPERTENSION: ICD-10-CM

## 2019-02-06 DIAGNOSIS — R00.2 PALPITATIONS: ICD-10-CM

## 2019-02-06 DIAGNOSIS — E78.00 PURE HYPERCHOLESTEROLEMIA: ICD-10-CM

## 2019-02-06 NOTE — TELEPHONE ENCOUNTER
Patient needs Rx sent [facility]. Already ordered at local pharmacy.

## 2019-02-08 RX ORDER — LOSARTAN POTASSIUM AND HYDROCHLOROTHIAZIDE 12.5; 1 MG/1; MG/1
1 TABLET ORAL
Qty: 90 TABLET | Refills: 0 | Status: SHIPPED | OUTPATIENT
Start: 2019-02-08 | End: 2019-04-17

## 2019-02-08 RX ORDER — ROSUVASTATIN CALCIUM 10 MG/1
TABLET, FILM COATED ORAL
Qty: 90 TABLET | Refills: 0 | Status: SHIPPED | OUTPATIENT
Start: 2019-02-08 | End: 2019-02-13 | Stop reason: ALTCHOICE

## 2019-02-13 RX ORDER — ROSUVASTATIN CALCIUM 10 MG/1
10 TABLET, COATED ORAL NIGHTLY
Qty: 90 TABLET | Refills: 0 | Status: SHIPPED | OUTPATIENT
Start: 2019-02-13 | End: 2019-04-17

## 2019-03-22 ENCOUNTER — OFFICE VISIT (OUTPATIENT)
Dept: FAMILY MEDICINE CLINIC | Facility: CLINIC | Age: 64
End: 2019-03-22
Payer: MEDICARE

## 2019-03-22 VITALS
SYSTOLIC BLOOD PRESSURE: 128 MMHG | RESPIRATION RATE: 16 BRPM | WEIGHT: 119 LBS | TEMPERATURE: 98 F | DIASTOLIC BLOOD PRESSURE: 74 MMHG | HEART RATE: 74 BPM | HEIGHT: 59 IN | BODY MASS INDEX: 23.99 KG/M2 | OXYGEN SATURATION: 99 %

## 2019-03-22 DIAGNOSIS — R01.1 CARDIAC MURMUR: ICD-10-CM

## 2019-03-22 DIAGNOSIS — J06.9 UPPER RESPIRATORY TRACT INFECTION, UNSPECIFIED TYPE: Primary | ICD-10-CM

## 2019-03-22 PROBLEM — N18.30 CKD (CHRONIC KIDNEY DISEASE) STAGE 3, GFR 30-59 ML/MIN (HCC): Chronic | Status: ACTIVE | Noted: 2019-03-22

## 2019-03-22 PROCEDURE — 99202 OFFICE O/P NEW SF 15 MIN: CPT | Performed by: NURSE PRACTITIONER

## 2019-03-22 NOTE — PATIENT INSTRUCTIONS
Take plain Mucinex/guaifenesin 600 mg per box instructions    Viral Upper Respiratory Illness (Adult)  You have a viral upper respiratory illness (URI), which is another term for the common cold. This illness is contagious during the first few days.  It i When to seek medical advice  Call your healthcare provider right away if any of these occur:  · Cough with lots of colored sputum (mucus)  · Severe headache; face, neck, or ear pain  · Difficulty swallowing due to throat pain  · Fever of 100.4°F (38°C) or Heart murmurs do not usually cause symptoms. They tend to be found when your healthcare provider is listening to your heart for another reason. People with an abnormal heart murmur may have symptoms of the problem causing the murmur.  Symptoms can include: © 8471-2810 [employer] [address]. [address] [facility], [address] [place]. All rights reserved. This information is not intended as a substitute for professional medical care. Always follow your healthcare professional's instructions.     Patient ins

## 2019-03-22 NOTE — PROGRESS NOTES
CHIEF COMPLAINT:   Patient presents with:  URI      HPI:   Kerri Oh is a 71year old female who presents with URI symptoms for  4 days. Onset was gradual, Location is mid face, ears and throat. Symptoms are present.  Described as nasal pressure, GI: patient denies N/V/C or abdominal pain  MUSCULOSKELETAL: denies any osseous, soft tissue, or joint complaints. NEURO: denies headaches, numbness, abnormal sensation,  or change in balance.   Patient reports with confidence all of her chronic medical c PLAN: Meds as below. Comfort care as described in Patient Instructions.     URI  Patient instructed to consider acetaminophen per box instructions for pain and fever,  consider OTC guaifenesin per box instructions - patient reports she has taken Mucinex re · You may use acetaminophen or ibuprofen to control pain and fever, unless another medicine was prescribed. If you have chronic liver or kidney disease, have ever had a stomach ulcer or gastrointestinal bleeding, or are taking blood-thinning medicines, clyde The heart makes sounds as it beats. These sounds occur as the heart valves open and close to allow blood to flow through the heart. A heart murmur is an extra noise heard during a heartbeat.  The noise is caused when blood does not flow smoothly through the · Procedures or surgery to fix or replace a diseased or damaged heart valve  · Procedures or surgery to fix a hole in the heart  What are the complications of a heart murmur? An innocent heart murmur has no complications.  Complications of an abnormal hear

## 2019-04-08 ENCOUNTER — TELEPHONE (OUTPATIENT)
Dept: FAMILY MEDICINE CLINIC | Facility: CLINIC | Age: 64
End: 2019-04-08

## 2019-04-15 ENCOUNTER — TELEPHONE (OUTPATIENT)
Dept: FAMILY MEDICINE CLINIC | Facility: CLINIC | Age: 64
End: 2019-04-15

## 2019-04-15 NOTE — TELEPHONE ENCOUNTER
Called patient. States she is getting low on all of her medications. Advised it has been over a year since she has had labs done. She needs appt. To follow up with Dr. Bandar Parr. Appt scheduled for this week.     Future Appointments   Date Time Provider Dep

## 2019-04-17 ENCOUNTER — OFFICE VISIT (OUTPATIENT)
Dept: FAMILY MEDICINE CLINIC | Facility: CLINIC | Age: 64
End: 2019-04-17
Payer: MEDICARE

## 2019-04-17 VITALS
RESPIRATION RATE: 14 BRPM | HEART RATE: 64 BPM | SYSTOLIC BLOOD PRESSURE: 132 MMHG | DIASTOLIC BLOOD PRESSURE: 68 MMHG | OXYGEN SATURATION: 99 % | BODY MASS INDEX: 25 KG/M2 | TEMPERATURE: 99 F | WEIGHT: 122.25 LBS

## 2019-04-17 DIAGNOSIS — R05.9 COUGH: ICD-10-CM

## 2019-04-17 DIAGNOSIS — E78.00 PURE HYPERCHOLESTEROLEMIA: Primary | ICD-10-CM

## 2019-04-17 DIAGNOSIS — I10 ESSENTIAL HYPERTENSION: ICD-10-CM

## 2019-04-17 DIAGNOSIS — R73.03 PREDIABETES: ICD-10-CM

## 2019-04-17 DIAGNOSIS — R00.2 PALPITATIONS: ICD-10-CM

## 2019-04-17 DIAGNOSIS — N18.30 CKD (CHRONIC KIDNEY DISEASE) STAGE 3, GFR 30-59 ML/MIN (HCC): Chronic | ICD-10-CM

## 2019-04-17 PROCEDURE — 99214 OFFICE O/P EST MOD 30 MIN: CPT | Performed by: FAMILY MEDICINE

## 2019-04-17 RX ORDER — LOSARTAN POTASSIUM AND HYDROCHLOROTHIAZIDE 12.5; 1 MG/1; MG/1
1 TABLET ORAL
Qty: 90 TABLET | Refills: 1 | Status: SHIPPED | OUTPATIENT
Start: 2019-04-17 | End: 2019-12-02

## 2019-04-17 RX ORDER — ROSUVASTATIN CALCIUM 10 MG/1
10 TABLET, COATED ORAL NIGHTLY
Qty: 90 TABLET | Refills: 1 | Status: SHIPPED | OUTPATIENT
Start: 2019-04-17 | End: 2019-12-02

## 2019-04-17 NOTE — PROGRESS NOTES
Akshat Russo is a 71year old female.   Patient presents with:  Medication Follow-Up: Review meds and refill  Cough: Pt has had cough for more then a month may have allergies not sure    HPI:   HTN:sheri is seen for follow up and medication refill well nourished,in no apparent distress  SKIN: no rashes,no suspicious lesions  HEENT: atraumatic, normocephalic,ears and throat are clear  NECK: supple,no adenopathy,no bruits  LUNGS: clear to auscultation, no wheezing, rhonchi or rales  CARDIO: RRR withou 100-12.5 MG Oral Tab; Take 1 tablet by mouth once daily.  -     COMP METABOLIC PANEL (14);  Future  -     COMP METABOLIC PANEL (14)    Prediabetes controlled  -     HEMOGLOBIN A1C; Future  -     HEMOGLOBIN A1C    CKD (chronic kidney disease) stage 3, GFR 30

## 2019-04-17 NOTE — PATIENT INSTRUCTIONS
Lifestyle Changes to Control Cholesterol  You can control your cholesterol through diet, exercise, weight management, quitting smoking, stress management, and taking your medicines right. These things can also lower your risk for cardiovascular disease. · Riding a bicycle or stationary bike  · Dancing  Managing your weight  If you are overweight or obese, your healthcare provider will work with you to help you lose weight and lower your BMI (body mass index).  Making diet changes and getting more physical · Don’t skip a dose or stop taking your medicine because you feel better or because your cholesterol numbers go down. Never stop taking your medicine unless your healthcare provider has told you it’s OK.   · Ask your healthcare provider if you have any ques © 1347-4803 [employer] [address]. [address] [facility], [facility] [place]. All rights reserved. This information is not intended as a substitute for professional medical care. Always follow your healthcare professional's instructions.

## 2019-07-02 ENCOUNTER — OFFICE VISIT (OUTPATIENT)
Dept: FAMILY MEDICINE CLINIC | Facility: CLINIC | Age: 64
End: 2019-07-02
Payer: MEDICARE

## 2019-07-02 VITALS
WEIGHT: 122.44 LBS | BODY MASS INDEX: 25.7 KG/M2 | DIASTOLIC BLOOD PRESSURE: 78 MMHG | TEMPERATURE: 98 F | RESPIRATION RATE: 16 BRPM | SYSTOLIC BLOOD PRESSURE: 122 MMHG | OXYGEN SATURATION: 98 % | HEART RATE: 61 BPM | HEIGHT: 58 IN

## 2019-07-02 DIAGNOSIS — Z00.00 ENCOUNTER FOR ANNUAL HEALTH EXAMINATION: Primary | ICD-10-CM

## 2019-07-02 DIAGNOSIS — I10 ESSENTIAL HYPERTENSION: ICD-10-CM

## 2019-07-02 DIAGNOSIS — Z12.39 SCREENING FOR BREAST CANCER: ICD-10-CM

## 2019-07-02 DIAGNOSIS — Z23 NEED FOR VACCINATION: ICD-10-CM

## 2019-07-02 DIAGNOSIS — R73.03 PREDIABETES: ICD-10-CM

## 2019-07-02 DIAGNOSIS — N18.30 CKD (CHRONIC KIDNEY DISEASE) STAGE 3, GFR 30-59 ML/MIN (HCC): Chronic | ICD-10-CM

## 2019-07-02 DIAGNOSIS — E78.00 PURE HYPERCHOLESTEROLEMIA: ICD-10-CM

## 2019-07-02 DIAGNOSIS — Z12.11 SCREENING FOR COLON CANCER: ICD-10-CM

## 2019-07-02 DIAGNOSIS — Z11.59 NEED FOR HEPATITIS C SCREENING TEST: ICD-10-CM

## 2019-07-02 DIAGNOSIS — Z78.0 POSTMENOPAUSAL: ICD-10-CM

## 2019-07-02 PROBLEM — N18.9 CHRONIC KIDNEY DISEASE: Status: RESOLVED | Noted: 2017-02-20 | Resolved: 2019-07-02

## 2019-07-02 PROCEDURE — G0402 INITIAL PREVENTIVE EXAM: HCPCS | Performed by: FAMILY MEDICINE

## 2019-07-02 PROCEDURE — 90670 PCV13 VACCINE IM: CPT | Performed by: FAMILY MEDICINE

## 2019-07-02 PROCEDURE — 99397 PER PM REEVAL EST PAT 65+ YR: CPT | Performed by: FAMILY MEDICINE

## 2019-07-02 PROCEDURE — G0009 ADMIN PNEUMOCOCCAL VACCINE: HCPCS | Performed by: FAMILY MEDICINE

## 2019-07-02 PROCEDURE — 96160 PT-FOCUSED HLTH RISK ASSMT: CPT | Performed by: FAMILY MEDICINE

## 2019-07-02 NOTE — PATIENT INSTRUCTIONS
Gloria Paz's SCREENING SCHEDULE   Tests on this list are recommended by your physician but may not be covered, or covered at this frequency, by your insurer. Please check with your insurance carrier before scheduling to verify coverage.    Power Crowe 75     Colonoscopy Screen   Covered every 10 years- more often if abnormal Colonoscopy due on 01/03/2004 Update Health Maintenance if applicable    Flex Sigmoidoscopy Screen  Covered every 5 years No results found for this or any previous visit.  No flowshe Pneumococcal 23 (Pneumovax)  Covered Once after 65 No orders found for this or any previous visit. Please get once after your 65th birthday    Hepatitis B for Moderate/High Risk       No orders found for this or any previous visit.  Medium/high risk factors

## 2019-07-02 NOTE — PROGRESS NOTES
Patient presents with: Other: MAPS VISIT  Test Results    HPI:   Hiram Gitelman is a 71year old female who presents for a MA (Medicare Advantage) [address] [place] [address] (Once per calendar year).     Her last annual assessment has been over 1 year: Annual Physic Medicine)    Patient Active Problem List:     Pure hypercholesterolemia     Essential hypertension     Palpitations     Vitamin D deficiency     Iron deficiency anemia     Impaired fasting glucose     Hypertensive CKD (chronic kidney disease)     Chronic k alcohol or use drugs.      REVIEW OF SYSTEMS:   Constitutional: negative  Eyes: negative  Ears, nose, mouth, throat, and face: negative  Respiratory: negative  Cardiovascular: negative  Gastrointestinal: negative  Genitourinary:negative  Integument/breast: Bharathi Garrison is a 71year old female who presents for a Medicare  Gloria was seen today for other and test results.     Diagnoses and all orders for this visit:    Encounter for annual health examination    Postmenopausal  -     XR DEXA BONE DENSIT (mg/dL (calc))   Date Value   06/25/2019 82        EKG - w/ Initial Preventative Physical Exam only, or if medically necessary Electrocardiogram date03/06/2018       Colorectal Cancer Screening      Colonoscopy Screen every 10 years Colonoscopy due on 01/0 cut with metal- TD and TDaP Not covered by Medicare Part B No vaccine history found This may be covered with your prescription benefits, but Medicare does not cover unless Medically needed    Zoster  Not covered by Medicare Part B No vaccine history found

## 2019-07-10 ENCOUNTER — TELEPHONE (OUTPATIENT)
Dept: FAMILY MEDICINE CLINIC | Facility: CLINIC | Age: 64
End: 2019-07-10

## 2019-07-10 NOTE — TELEPHONE ENCOUNTER
Patient called with question regarding insurance coverage for mammogram, dexa bone scan and stool test.  States all orders were for [facility] and she in unsure if her [employer] will cover.   Advised to call the number on the back of her insurance card a

## 2019-07-15 ENCOUNTER — HOSPITAL ENCOUNTER (OUTPATIENT)
Dept: MAMMOGRAPHY | Age: 64
Discharge: HOME | End: 2019-07-15
Attending: FAMILY MEDICINE
Payer: MEDICARE

## 2019-07-15 ENCOUNTER — HOSPITAL ENCOUNTER (OUTPATIENT)
Dept: BONE DENSITY | Age: 64
Discharge: HOME | End: 2019-07-15
Attending: FAMILY MEDICINE
Payer: MEDICARE

## 2019-07-15 DIAGNOSIS — Z78.0 POSTMENOPAUSAL: ICD-10-CM

## 2019-07-15 DIAGNOSIS — Z12.39 SCREENING FOR BREAST CANCER: ICD-10-CM

## 2019-07-15 PROCEDURE — 77063 BREAST TOMOSYNTHESIS BI: CPT | Performed by: FAMILY MEDICINE

## 2019-07-15 PROCEDURE — 77067 SCR MAMMO BI INCL CAD: CPT | Performed by: FAMILY MEDICINE

## 2019-07-15 PROCEDURE — 77080 DXA BONE DENSITY AXIAL: CPT | Performed by: FAMILY MEDICINE

## 2019-07-19 PROCEDURE — 82274 ASSAY TEST FOR BLOOD FECAL: CPT

## 2019-07-25 ENCOUNTER — APPOINTMENT (OUTPATIENT)
Dept: LAB | Age: 64
End: 2019-07-25
Attending: FAMILY MEDICINE
Payer: MEDICARE

## 2019-07-25 DIAGNOSIS — Z12.11 SCREENING FOR COLON CANCER: ICD-10-CM

## 2019-08-07 ENCOUNTER — TELEPHONE (OUTPATIENT)
Dept: FAMILY MEDICINE CLINIC | Facility: CLINIC | Age: 64
End: 2019-08-07

## 2019-08-07 NOTE — TELEPHONE ENCOUNTER
Left detailed message for pt that our records indicate that she [facility] [address] [facility] and [facility] was sent    Dexa Triage Shireen Burgos called and discussed Normal results    Stool Study Erin called results were also normal.

## 2019-08-07 NOTE — TELEPHONE ENCOUNTER
Patient called In asking for results of her darin, dexa, lab she had done at the end of the month in July .

## 2019-09-18 ENCOUNTER — PATIENT OUTREACH (OUTPATIENT)
Dept: CASE MANAGEMENT | Age: 64
End: 2019-09-18

## 2019-10-24 NOTE — TELEPHONE ENCOUNTER
Future Appointments  Date Time Provider Clifton Kiersten   1/16/2017 3:30 PM Lucrecia Del Rio, DO [facility] [address]     Establishing with new PCP.      See open TE. 98.2

## 2019-12-02 DIAGNOSIS — I10 ESSENTIAL HYPERTENSION: ICD-10-CM

## 2019-12-02 DIAGNOSIS — E78.00 PURE HYPERCHOLESTEROLEMIA: ICD-10-CM

## 2019-12-02 DIAGNOSIS — R00.2 PALPITATIONS: ICD-10-CM

## 2019-12-04 RX ORDER — LOSARTAN POTASSIUM AND HYDROCHLOROTHIAZIDE 12.5; 1 MG/1; MG/1
TABLET ORAL
Qty: 15 TABLET | Refills: 0 | Status: SHIPPED | OUTPATIENT
Start: 2019-12-04 | End: 2019-12-06 | Stop reason: DRUGHIGH

## 2019-12-04 RX ORDER — ROSUVASTATIN CALCIUM 10 MG/1
10 TABLET, COATED ORAL NIGHTLY
Qty: 15 TABLET | Refills: 0 | Status: SHIPPED | OUTPATIENT
Start: 2019-12-04 | End: 2019-12-06

## 2019-12-04 NOTE — TELEPHONE ENCOUNTER
Patient is due for 6 month follow up, 90 day prescription will be given at appointment. #15 sent to [facility] not mail order pharmacy.

## 2019-12-04 NOTE — TELEPHONE ENCOUNTER
LOV 4/19 Return in 6 months (on 1/2/2020). LAST LAB 6/19    LAST RX   metoprolol Tartrate 25 MG Oral Tab 90 tablet 1 4/17/2019     Losartan Potassium-HCTZ 100-12.5 MG Oral Tab 90 tablet 1 4/17/2019     Rosuvastatin Calcium 10 MG Oral Tab 90 tablet 1 4/17/2019           Next OV Visit date not found      PROTOCOL  Hypertension Medications Protocol Passed    Please advise on refill.  Thank You

## 2019-12-06 ENCOUNTER — OFFICE VISIT (OUTPATIENT)
Dept: FAMILY MEDICINE CLINIC | Facility: CLINIC | Age: 64
End: 2019-12-06
Payer: MEDICARE

## 2019-12-06 VITALS
SYSTOLIC BLOOD PRESSURE: 112 MMHG | RESPIRATION RATE: 18 BRPM | DIASTOLIC BLOOD PRESSURE: 68 MMHG | WEIGHT: 123 LBS | HEIGHT: 58 IN | BODY MASS INDEX: 25.82 KG/M2 | TEMPERATURE: 98 F | OXYGEN SATURATION: 99 % | HEART RATE: 60 BPM

## 2019-12-06 DIAGNOSIS — E78.00 PURE HYPERCHOLESTEROLEMIA: ICD-10-CM

## 2019-12-06 DIAGNOSIS — N18.30 CKD (CHRONIC KIDNEY DISEASE) STAGE 3, GFR 30-59 ML/MIN (HCC): Chronic | ICD-10-CM

## 2019-12-06 DIAGNOSIS — R73.03 PREDIABETES: ICD-10-CM

## 2019-12-06 DIAGNOSIS — I10 ESSENTIAL HYPERTENSION: Primary | ICD-10-CM

## 2019-12-06 DIAGNOSIS — R00.2 PALPITATIONS: ICD-10-CM

## 2019-12-06 PROCEDURE — 99214 OFFICE O/P EST MOD 30 MIN: CPT | Performed by: FAMILY MEDICINE

## 2019-12-06 RX ORDER — ROSUVASTATIN CALCIUM 10 MG/1
10 TABLET, COATED ORAL NIGHTLY
Qty: 90 TABLET | Refills: 0 | Status: SHIPPED | OUTPATIENT
Start: 2019-12-06 | End: 2020-02-20

## 2019-12-06 RX ORDER — LOSARTAN POTASSIUM AND HYDROCHLOROTHIAZIDE 12.5; 5 MG/1; MG/1
1 TABLET ORAL DAILY
Qty: 90 TABLET | Refills: 0 | Status: SHIPPED | OUTPATIENT
Start: 2019-12-06 | End: 2020-02-20

## 2019-12-06 RX ORDER — LOSARTAN POTASSIUM AND HYDROCHLOROTHIAZIDE 12.5; 1 MG/1; MG/1
1 TABLET ORAL
Qty: 15 TABLET | Refills: 0 | Status: CANCELLED | OUTPATIENT
Start: 2019-12-06

## 2019-12-06 NOTE — PROGRESS NOTES
Zerita Essex is a 71year old female.   Patient presents with:  Hypertension: f/u    HPI:   HTN:patietn is seen for follow up and medication refill, compliant with medication and low salt diet, denies GIBSON, blurred vision, swelling in the legs, state SpO2 99%   BMI 25.71 kg/m²   GENERAL: well developed, well nourished,in no apparent distress  SKIN: no rashes,no suspicious lesions  HEENT: atraumatic, normocephalic,ears and throat are clear  NECK: supple,no adenopathy,no bruits  LUNGS: clear to auscult

## 2019-12-20 DIAGNOSIS — R00.2 PALPITATIONS: ICD-10-CM

## 2019-12-20 DIAGNOSIS — E78.00 PURE HYPERCHOLESTEROLEMIA: ICD-10-CM

## 2019-12-20 DIAGNOSIS — I10 ESSENTIAL HYPERTENSION: ICD-10-CM

## 2019-12-20 RX ORDER — ROSUVASTATIN CALCIUM 10 MG/1
10 TABLET, COATED ORAL NIGHTLY
Qty: 15 TABLET | Refills: 0 | OUTPATIENT
Start: 2019-12-20

## 2020-01-01 ENCOUNTER — EXTERNAL RECORD (OUTPATIENT)
Dept: OTHER | Age: 65
End: 2020-01-01

## 2020-02-19 LAB
ALBUMIN/GLOBULIN RATIO: 1.4 (CALC) (ref 1–2.5)
ALBUMIN: 4.2 G/DL (ref 3.6–5.1)
ALKALINE PHOSPHATASE: 69 U/L (ref 37–153)
ALT: 17 U/L (ref 6–29)
AST: 19 U/L (ref 10–35)
BILIRUBIN, TOTAL: 0.4 MG/DL (ref 0.2–1.2)
BUN/CREATININE RATIO: 10 (CALC) (ref 6–22)
BUN: 10 MG/DL (ref 7–25)
CALCIUM: 9.6 MG/DL (ref 8.6–10.4)
CARBON DIOXIDE: 28 MMOL/L (ref 20–32)
CHLORIDE: 103 MMOL/L (ref 98–110)
CHOL/HDLC RATIO: 2.7 (CALC)
CHOLESTEROL, TOTAL: 170 MG/DL
CREATININE: 1.03 MG/DL (ref 0.5–0.99)
EGFR IF AFRICN AM: 66 ML/MIN/1.73M2
EGFR IF NONAFRICN AM: 57 ML/MIN/1.73M2
GLOBULIN: 2.9 G/DL (CALC) (ref 1.9–3.7)
GLUCOSE: 97 MG/DL (ref 65–99)
HDL CHOLESTEROL: 63 MG/DL
HEMOGLOBIN A1C: 6.3 % OF TOTAL HGB
LDL-CHOLESTEROL: 87 MG/DL (CALC)
NON-HDL CHOLESTEROL: 107 MG/DL (CALC)
POTASSIUM: 4.8 MMOL/L (ref 3.5–5.3)
PROTEIN, TOTAL: 7.1 G/DL (ref 6.1–8.1)
SODIUM: 138 MMOL/L (ref 135–146)
TRIGLYCERIDES: 107 MG/DL

## 2020-02-20 ENCOUNTER — OFFICE VISIT (OUTPATIENT)
Dept: FAMILY MEDICINE CLINIC | Facility: CLINIC | Age: 65
End: 2020-02-20
Payer: MEDICARE

## 2020-02-20 VITALS
SYSTOLIC BLOOD PRESSURE: 110 MMHG | DIASTOLIC BLOOD PRESSURE: 62 MMHG | BODY MASS INDEX: 25.19 KG/M2 | RESPIRATION RATE: 18 BRPM | OXYGEN SATURATION: 98 % | HEIGHT: 58 IN | HEART RATE: 66 BPM | TEMPERATURE: 98 F | WEIGHT: 120 LBS

## 2020-02-20 DIAGNOSIS — M54.2 CERVICALGIA: ICD-10-CM

## 2020-02-20 DIAGNOSIS — F41.9 ANXIETY: ICD-10-CM

## 2020-02-20 DIAGNOSIS — R00.2 PALPITATIONS: ICD-10-CM

## 2020-02-20 DIAGNOSIS — N18.30 CKD (CHRONIC KIDNEY DISEASE) STAGE 3, GFR 30-59 ML/MIN (HCC): Chronic | ICD-10-CM

## 2020-02-20 DIAGNOSIS — R73.03 PREDIABETES: ICD-10-CM

## 2020-02-20 DIAGNOSIS — F51.04 PSYCHOPHYSIOLOGICAL INSOMNIA: ICD-10-CM

## 2020-02-20 DIAGNOSIS — E78.00 PURE HYPERCHOLESTEROLEMIA: ICD-10-CM

## 2020-02-20 DIAGNOSIS — M79.602 LEFT ARM PAIN: ICD-10-CM

## 2020-02-20 DIAGNOSIS — I10 ESSENTIAL HYPERTENSION: Primary | ICD-10-CM

## 2020-02-20 DIAGNOSIS — R20.2 LEFT HAND PARESTHESIA: ICD-10-CM

## 2020-02-20 PROCEDURE — 93000 ELECTROCARDIOGRAM COMPLETE: CPT | Performed by: FAMILY MEDICINE

## 2020-02-20 PROCEDURE — 99214 OFFICE O/P EST MOD 30 MIN: CPT | Performed by: FAMILY MEDICINE

## 2020-02-20 RX ORDER — LOSARTAN POTASSIUM AND HYDROCHLOROTHIAZIDE 12.5; 5 MG/1; MG/1
1 TABLET ORAL DAILY
Qty: 90 TABLET | Refills: 1 | Status: SHIPPED | OUTPATIENT
Start: 2020-02-20 | End: 2020-09-14

## 2020-02-20 RX ORDER — ALPRAZOLAM 0.25 MG/1
0.25 TABLET ORAL NIGHTLY PRN
Qty: 30 TABLET | Refills: 0 | Status: SHIPPED | OUTPATIENT
Start: 2020-02-20 | End: 2020-03-21

## 2020-02-20 RX ORDER — ROSUVASTATIN CALCIUM 10 MG/1
10 TABLET, COATED ORAL NIGHTLY
Qty: 90 TABLET | Refills: 1 | Status: SHIPPED | OUTPATIENT
Start: 2020-02-20 | End: 2020-09-14

## 2020-02-20 NOTE — PROGRESS NOTES
Minal Eaton is a 76year old female. Patient presents with:  Lab Results    HPI:   HTN: complaint with medication and low salt diet, states blood pressure is well controlled.  Denies GIBSON, blurred vision, dizziness, palpitations or swelling in the headaches  PSYCH: feels stressed and anxious    EXAM:   /62   Pulse 66   Temp 97.6 °F (36.4 °C) (Temporal)   Resp 18   Ht 58\"   Wt 120 lb (54.4 kg)   SpO2 98%   BMI 25.08 kg/m²   GENERAL: well developed, well nourished,in no apparent distress  SKIN: axis, normal intervals, no acute ST/T wave changes. -     CARD TREADMILL STRESS, ADULT (CPT=93017); Future    Palpitations  -     ELECTROCARDIOGRAM, COMPLETE--NSR, normal axis, normal intervals, no acute ST/T wave changes.   -     CARD TREADMILL STRESS, AD

## 2020-02-20 NOTE — PATIENT INSTRUCTIONS
If stress test is normal and you still have arm and neck pain will refer for physical therapy. Controlling High Blood Pressure  High blood pressure (hypertension) is often called the silent killer.  This is because many people who have it, don’t know i weight  · Ask your healthcare provider how many calories to eat a day. Then stick to that number. · Ask your healthcare provider what weight range is healthiest for you.  If you are overweight, a weight loss of only 3% to 5% of your body weight can help lo your healthcare professional's instructions. Treating Insomnia     Learning to relax before bedtime can improve your sleep. Good sleeping habits are a key part of treatment.  If needed, some medicines may help you sleep better at first. Making heal mattress and pillow. Learn to relax  Stress, anxiety, and body tension may keep you awake at night. To unwind before bedtime, try a warm bath, meditation, or yoga. Also try the following:  · Deep breathing. Sit or lie back in a chair.  Take a slow, deep br for prediabetes? The exact cause of prediabetes is not clear. But certain risk factors make a person more likely to have it.  These include:  · A family history of type 2 diabetes  · Being overweight  · Being over age 39  · Have hypertension or elevated ch diabetes. Your healthcare provider can talk with you about these. Stopping smoking will decrease your risk of developing diabetes, but you may gain some weight if you are not careful. Follow-up  If it is untreated, prediabetes can turn into diabetes.  This

## 2020-02-29 ENCOUNTER — HOSPITAL ENCOUNTER (OUTPATIENT)
Dept: CV DIAGNOSTICS | Facility: HOSPITAL | Age: 65
Discharge: HOME | End: 2020-02-29
Attending: FAMILY MEDICINE
Payer: MEDICARE

## 2020-02-29 ENCOUNTER — HOSPITAL ENCOUNTER (OUTPATIENT)
Dept: GENERAL RADIOLOGY | Facility: HOSPITAL | Age: 65
Discharge: HOME | End: 2020-02-29
Attending: FAMILY MEDICINE
Payer: MEDICARE

## 2020-02-29 DIAGNOSIS — M79.602 LEFT ARM PAIN: ICD-10-CM

## 2020-02-29 DIAGNOSIS — R00.2 PALPITATIONS: ICD-10-CM

## 2020-02-29 DIAGNOSIS — M54.2 CERVICALGIA: ICD-10-CM

## 2020-02-29 PROCEDURE — 72050 X-RAY EXAM NECK SPINE 4/5VWS: CPT | Performed by: FAMILY MEDICINE

## 2020-02-29 PROCEDURE — 93018 CV STRESS TEST I&R ONLY: CPT | Performed by: FAMILY MEDICINE

## 2020-02-29 PROCEDURE — 93017 CV STRESS TEST TRACING ONLY: CPT | Performed by: FAMILY MEDICINE

## 2020-03-10 ENCOUNTER — TELEPHONE (OUTPATIENT)
Dept: FAMILY MEDICINE CLINIC | Facility: CLINIC | Age: 65
End: 2020-03-10

## 2020-03-10 DIAGNOSIS — R94.39 ABNORMAL STRESS TEST: Primary | ICD-10-CM

## 2020-03-10 NOTE — TELEPHONE ENCOUNTER
Called patient to advise of new Cardiology referral.  States it would be 2 months before she could see Dr. Castro Service. She is requesting a referral to [address] [place], [place] [address] [place].  Patient already checked to

## 2020-03-10 NOTE — TELEPHONE ENCOUNTER
Pt called again asking when will she get a call back. Informed her the message was sent to the Dr & the dr is currently seeing patients.

## 2020-03-10 NOTE — TELEPHONE ENCOUNTER
Pt called  & cancelled her 3/12/20 appointment. Said she dis not want to keep because she cannot get into the heart Dr that Dr Noris Kohler suggested. Pt wants to speak directly to the Dr about getting another heart dr for her condition.

## 2020-03-10 NOTE — TELEPHONE ENCOUNTER
Dr. Shaun Contreras,  [facility]   Please advise

## 2020-03-12 NOTE — TELEPHONE ENCOUNTER
Called Dr. Manjit Villegas office and verified patient has appt tomorrow at 3:15 PM  All requested documentation faxed to the office at [phone].

## 2020-03-12 NOTE — TELEPHONE ENCOUNTER
Patient came into the office she thought she had a appointment for 10:40am but I advised her that it was canceled , stated in the reasoning that she did not need the appointment anymore .  She said no that was not right she needs this appointment because sh

## 2020-03-13 ENCOUNTER — OFFICE VISIT (OUTPATIENT)
Dept: CARDIOLOGY | Age: 65
End: 2020-03-13

## 2020-03-13 VITALS
DIASTOLIC BLOOD PRESSURE: 68 MMHG | SYSTOLIC BLOOD PRESSURE: 140 MMHG | HEIGHT: 56 IN | HEART RATE: 66 BPM | BODY MASS INDEX: 26.54 KG/M2 | WEIGHT: 118 LBS

## 2020-03-13 DIAGNOSIS — E78.00 PURE HYPERCHOLESTEROLEMIA: ICD-10-CM

## 2020-03-13 DIAGNOSIS — R01.1 MURMUR: ICD-10-CM

## 2020-03-13 DIAGNOSIS — R94.39 ABNORMAL STRESS TEST: ICD-10-CM

## 2020-03-13 DIAGNOSIS — I10 ESSENTIAL HYPERTENSION: Primary | ICD-10-CM

## 2020-03-13 PROCEDURE — 3078F DIAST BP <80 MM HG: CPT | Performed by: INTERNAL MEDICINE

## 2020-03-13 RX ORDER — ASPIRIN 81 MG/1
81 TABLET ORAL
COMMUNITY

## 2020-03-13 RX ORDER — LOSARTAN POTASSIUM AND HYDROCHLOROTHIAZIDE 12.5; 5 MG/1; MG/1
1 TABLET ORAL
COMMUNITY
Start: 2020-02-20 | End: 2021-02-14

## 2020-03-13 RX ORDER — ROSUVASTATIN CALCIUM 10 MG/1
10 TABLET, COATED ORAL
COMMUNITY
Start: 2020-02-20

## 2020-03-13 RX ORDER — ALPRAZOLAM 0.25 MG/1
0.25 TABLET ORAL
COMMUNITY
Start: 2020-02-20 | End: 2020-05-07 | Stop reason: ALTCHOICE

## 2020-03-13 SDOH — HEALTH STABILITY: MENTAL HEALTH: HOW OFTEN DO YOU HAVE A DRINK CONTAINING ALCOHOL?: NEVER

## 2020-03-13 SDOH — SOCIAL STABILITY: SOCIAL NETWORK: ARE YOU MARRIED, WIDOWED, DIVORCED, SEPARATED, NEVER MARRIED, OR LIVING WITH A PARTNER?: MARRIED

## 2020-03-13 SDOH — HEALTH STABILITY: PHYSICAL HEALTH: ON AVERAGE, HOW MANY DAYS PER WEEK DO YOU ENGAGE IN MODERATE TO STRENUOUS EXERCISE (LIKE A BRISK WALK)?: 0 DAYS

## 2020-03-13 SDOH — HEALTH STABILITY: PHYSICAL HEALTH: ON AVERAGE, HOW MANY MINUTES DO YOU ENGAGE IN EXERCISE AT THIS LEVEL?: 0 MIN

## 2020-03-13 ASSESSMENT — ENCOUNTER SYMPTOMS
WEIGHT GAIN: 0
ALLERGIC/IMMUNOLOGIC COMMENTS: NO NEW FOOD ALLERGIES
WEIGHT LOSS: 0
SUSPICIOUS LESIONS: 0
HEMATOCHEZIA: 0
FEVER: 0
CHILLS: 0
BRUISES/BLEEDS EASILY: 0
HEMOPTYSIS: 0
COUGH: 0

## 2020-03-16 ENCOUNTER — TELEPHONE (OUTPATIENT)
Dept: FAMILY MEDICINE CLINIC | Facility: CLINIC | Age: 65
End: 2020-03-16

## 2020-03-16 NOTE — TELEPHONE ENCOUNTER
I called and spoke with the patient - she saw cardio in clinic and they ordered testing for the patient. Referrals for these tests will be placed and processed by the cardio office. Pt informed of this and states understanding.

## 2020-03-20 ENCOUNTER — TELEPHONE (OUTPATIENT)
Dept: CARDIOLOGY | Age: 65
End: 2020-03-20

## 2020-04-08 ENCOUNTER — TELEPHONE (OUTPATIENT)
Dept: CARDIOLOGY | Age: 65
End: 2020-04-08

## 2020-05-07 ENCOUNTER — OFFICE VISIT (OUTPATIENT)
Dept: CARDIOLOGY | Age: 65
End: 2020-05-07

## 2020-05-07 VITALS — HEIGHT: 56 IN | WEIGHT: 118 LBS | BODY MASS INDEX: 26.54 KG/M2

## 2020-05-07 DIAGNOSIS — R94.39 ABNORMAL STRESS TEST: ICD-10-CM

## 2020-05-07 DIAGNOSIS — E78.00 PURE HYPERCHOLESTEROLEMIA: ICD-10-CM

## 2020-05-07 DIAGNOSIS — R01.1 MURMUR: ICD-10-CM

## 2020-05-07 DIAGNOSIS — I10 ESSENTIAL HYPERTENSION: Primary | ICD-10-CM

## 2020-05-07 PROCEDURE — 99443 TELEPHONE E&M BY PHYSICIAN EST PT NOT ORIG PREV 7 DAYS 21-30 MIN: CPT | Performed by: INTERNAL MEDICINE

## 2020-05-07 SDOH — HEALTH STABILITY: PHYSICAL HEALTH: ON AVERAGE, HOW MANY MINUTES DO YOU ENGAGE IN EXERCISE AT THIS LEVEL?: 30 MIN

## 2020-05-07 SDOH — SOCIAL STABILITY: SOCIAL NETWORK: ARE YOU MARRIED, WIDOWED, DIVORCED, SEPARATED, NEVER MARRIED, OR LIVING WITH A PARTNER?: MARRIED

## 2020-05-07 SDOH — HEALTH STABILITY: PHYSICAL HEALTH: ON AVERAGE, HOW MANY DAYS PER WEEK DO YOU ENGAGE IN MODERATE TO STRENUOUS EXERCISE (LIKE A BRISK WALK)?: 4 DAYS

## 2020-05-07 SDOH — HEALTH STABILITY: MENTAL HEALTH: HOW OFTEN DO YOU HAVE A DRINK CONTAINING ALCOHOL?: NEVER

## 2020-05-07 ASSESSMENT — PATIENT HEALTH QUESTIONNAIRE - PHQ9
2. FEELING DOWN, DEPRESSED OR HOPELESS: NOT AT ALL
1. LITTLE INTEREST OR PLEASURE IN DOING THINGS: NOT AT ALL
SUM OF ALL RESPONSES TO PHQ9 QUESTIONS 1 AND 2: 0
SUM OF ALL RESPONSES TO PHQ9 QUESTIONS 1 AND 2: 0

## 2020-05-12 ENCOUNTER — MED REC SCAN ONLY (OUTPATIENT)
Dept: FAMILY MEDICINE CLINIC | Facility: CLINIC | Age: 65
End: 2020-05-12

## 2020-05-15 ENCOUNTER — APPOINTMENT (OUTPATIENT)
Dept: CARDIOLOGY | Age: 65
End: 2020-05-15

## 2020-05-29 DIAGNOSIS — Z01.812 PRE-PROCEDURE LAB EXAM: Primary | ICD-10-CM

## 2020-06-18 ENCOUNTER — TELEPHONE (OUTPATIENT)
Dept: FAMILY MEDICINE CLINIC | Facility: CLINIC | Age: 65
End: 2020-06-18

## 2020-06-30 ENCOUNTER — LAB ENCOUNTER (OUTPATIENT)
Dept: LAB | Facility: HOSPITAL | Age: 65
End: 2020-06-30
Attending: INTERNAL MEDICINE
Payer: MEDICARE

## 2020-06-30 LAB
SARS-COV-2 RNA SPEC QL NAA+PROBE: NOT DETECTED
SPECIMEN SOURCE: NORMAL

## 2020-07-03 ENCOUNTER — HOSPITAL ENCOUNTER (OUTPATIENT)
Dept: CV DIAGNOSTICS | Facility: HOSPITAL | Age: 65
Discharge: HOME | End: 2020-07-03
Attending: INTERNAL MEDICINE
Payer: MEDICARE

## 2020-07-03 DIAGNOSIS — R01.1 MURMUR: ICD-10-CM

## 2020-07-03 DIAGNOSIS — R94.39 ABNORMAL STRESS TEST: ICD-10-CM

## 2020-07-03 DIAGNOSIS — E78.00 PURE HYPERCHOLESTEROLEMIA: ICD-10-CM

## 2020-07-03 DIAGNOSIS — I10 ESSENTIAL HYPERTENSION, BENIGN: ICD-10-CM

## 2020-07-03 DIAGNOSIS — R01.1 EJECTION MURMUR: ICD-10-CM

## 2020-07-03 PROCEDURE — 93306 TTE W/DOPPLER COMPLETE: CPT | Performed by: INTERNAL MEDICINE

## 2020-07-03 PROCEDURE — 93350 STRESS TTE ONLY: CPT | Performed by: INTERNAL MEDICINE

## 2020-07-03 PROCEDURE — 93017 CV STRESS TEST TRACING ONLY: CPT | Performed by: INTERNAL MEDICINE

## 2020-07-03 PROCEDURE — 93018 CV STRESS TEST I&R ONLY: CPT | Performed by: INTERNAL MEDICINE

## 2020-07-08 ENCOUNTER — TELEPHONE (OUTPATIENT)
Dept: CARDIOLOGY | Age: 65
End: 2020-07-08

## 2020-07-31 ENCOUNTER — TELEPHONE (OUTPATIENT)
Dept: FAMILY MEDICINE CLINIC | Facility: CLINIC | Age: 65
End: 2020-07-31

## 2020-07-31 NOTE — TELEPHONE ENCOUNTER
I spoke to patient about scheduling her MAPS visit. Patient voiced understanding and stated she is in the middle of a couple things right now but will call the office back to schedule the MAPS visit.

## 2020-08-18 ENCOUNTER — MED REC SCAN ONLY (OUTPATIENT)
Dept: FAMILY MEDICINE CLINIC | Facility: CLINIC | Age: 65
End: 2020-08-18

## 2020-09-02 LAB
ALBUMIN/GLOBULIN RATIO: 1.4 (CALC) (ref 1–2.5)
ALBUMIN: 4.1 G/DL (ref 3.6–5.1)
ALKALINE PHOSPHATASE: 67 U/L (ref 37–153)
ALT: 18 U/L (ref 6–29)
AST: 21 U/L (ref 10–35)
BILIRUBIN, TOTAL: 0.5 MG/DL (ref 0.2–1.2)
BUN/CREATININE RATIO: 13 (CALC) (ref 6–22)
BUN: 14 MG/DL (ref 7–25)
CALCIUM: 9.5 MG/DL (ref 8.6–10.4)
CARBON DIOXIDE: 28 MMOL/L (ref 20–32)
CHLORIDE: 99 MMOL/L (ref 98–110)
CHOL/HDLC RATIO: 2.9 (CALC)
CHOLESTEROL, TOTAL: 178 MG/DL
CREATININE: 1.06 MG/DL (ref 0.5–0.99)
EGFR IF AFRICN AM: 63 ML/MIN/1.73M2
EGFR IF NONAFRICN AM: 55 ML/MIN/1.73M2
GLOBULIN: 2.9 G/DL (CALC) (ref 1.9–3.7)
GLUCOSE: 94 MG/DL (ref 65–99)
HDL CHOLESTEROL: 61 MG/DL
HEMOGLOBIN A1C: 5.8 % OF TOTAL HGB
LDL-CHOLESTEROL: 93 MG/DL (CALC)
NON-HDL CHOLESTEROL: 117 MG/DL (CALC)
POTASSIUM: 5 MMOL/L (ref 3.5–5.3)
PROTEIN, TOTAL: 7 G/DL (ref 6.1–8.1)
SODIUM: 134 MMOL/L (ref 135–146)
TRIGLYCERIDES: 138 MG/DL
TSH: 4.39 MIU/L (ref 0.4–4.5)

## 2020-09-14 ENCOUNTER — OFFICE VISIT (OUTPATIENT)
Dept: FAMILY MEDICINE CLINIC | Facility: CLINIC | Age: 65
End: 2020-09-14
Payer: MEDICARE

## 2020-09-14 VITALS
BODY MASS INDEX: 24.56 KG/M2 | WEIGHT: 117 LBS | HEART RATE: 60 BPM | OXYGEN SATURATION: 98 % | HEIGHT: 58 IN | RESPIRATION RATE: 18 BRPM | TEMPERATURE: 97 F | DIASTOLIC BLOOD PRESSURE: 70 MMHG | SYSTOLIC BLOOD PRESSURE: 100 MMHG

## 2020-09-14 DIAGNOSIS — I10 ESSENTIAL HYPERTENSION: ICD-10-CM

## 2020-09-14 DIAGNOSIS — Z00.00 ENCOUNTER FOR ANNUAL HEALTH EXAMINATION: Primary | ICD-10-CM

## 2020-09-14 DIAGNOSIS — F41.9 ANXIETY: ICD-10-CM

## 2020-09-14 DIAGNOSIS — Z23 NEED FOR VACCINATION: ICD-10-CM

## 2020-09-14 DIAGNOSIS — Z23 FLU VACCINE NEED: ICD-10-CM

## 2020-09-14 DIAGNOSIS — F51.04 PSYCHOPHYSIOLOGICAL INSOMNIA: ICD-10-CM

## 2020-09-14 DIAGNOSIS — R73.03 PREDIABETES: ICD-10-CM

## 2020-09-14 DIAGNOSIS — Z12.11 SCREENING FOR COLON CANCER: ICD-10-CM

## 2020-09-14 DIAGNOSIS — Z12.31 ENCOUNTER FOR SCREENING MAMMOGRAM FOR BREAST CANCER: ICD-10-CM

## 2020-09-14 DIAGNOSIS — M54.32 SCIATICA OF LEFT SIDE: ICD-10-CM

## 2020-09-14 DIAGNOSIS — N18.30 CKD (CHRONIC KIDNEY DISEASE) STAGE 3, GFR 30-59 ML/MIN (HCC): Chronic | ICD-10-CM

## 2020-09-14 DIAGNOSIS — Z13.6 SCREENING FOR CARDIOVASCULAR CONDITION: ICD-10-CM

## 2020-09-14 DIAGNOSIS — R00.2 PALPITATIONS: ICD-10-CM

## 2020-09-14 DIAGNOSIS — Z11.59 NEED FOR HEPATITIS C SCREENING TEST: ICD-10-CM

## 2020-09-14 DIAGNOSIS — E78.00 PURE HYPERCHOLESTEROLEMIA: ICD-10-CM

## 2020-09-14 PROBLEM — R01.1 MURMUR: Status: ACTIVE | Noted: 2020-03-13

## 2020-09-14 PROBLEM — M54.2 CERVICALGIA: Status: RESOLVED | Noted: 2020-02-20 | Resolved: 2020-09-14

## 2020-09-14 PROCEDURE — 3074F SYST BP LT 130 MM HG: CPT | Performed by: FAMILY MEDICINE

## 2020-09-14 PROCEDURE — 96160 PT-FOCUSED HLTH RISK ASSMT: CPT | Performed by: FAMILY MEDICINE

## 2020-09-14 PROCEDURE — 3008F BODY MASS INDEX DOCD: CPT | Performed by: FAMILY MEDICINE

## 2020-09-14 PROCEDURE — G0438 PPPS, INITIAL VISIT: HCPCS | Performed by: FAMILY MEDICINE

## 2020-09-14 PROCEDURE — 99397 PER PM REEVAL EST PAT 65+ YR: CPT | Performed by: FAMILY MEDICINE

## 2020-09-14 PROCEDURE — 3078F DIAST BP <80 MM HG: CPT | Performed by: FAMILY MEDICINE

## 2020-09-14 RX ORDER — LOSARTAN POTASSIUM AND HYDROCHLOROTHIAZIDE 12.5; 5 MG/1; MG/1
1 TABLET ORAL DAILY
Qty: 90 TABLET | Refills: 1 | Status: SHIPPED | OUTPATIENT
Start: 2020-09-14 | End: 2021-03-26

## 2020-09-14 RX ORDER — ROSUVASTATIN CALCIUM 10 MG/1
10 TABLET, COATED ORAL NIGHTLY
Qty: 90 TABLET | Refills: 1 | Status: SHIPPED | OUTPATIENT
Start: 2020-09-14 | End: 2021-03-26

## 2020-09-14 NOTE — PROGRESS NOTES
Patient presents with:  Physical    HPI:   El Zuluaga is a 76year old female who presents for a MA (Medicare Advantage) [address] [place] [address] (Once per calendar year).     Her last annual assessment has been over 1 year: Annual Physical due on 07/02/2020 available to patient in AVS       She does NOT have a Power of  for [employer] on file in [address] [place] [address] Rd.    Advance care planning including the explanation and discussion of advance directives standard forms performed Face to Face with patient and Family/robledo past medical history of Eczema, Other and unspecified hyperlipidemia, and Unspecified essential hypertension. She  has no past surgical history on file.     Her family history includes Diabetes in her father; Hypertension in her brother and mother; Josr Elliott Whispered Voice   normal     Visual Acuity  Right Eye Visual Acuity: Corrected Right Eye Chart Acuity: 20/25   Left Eye Visual Acuity: Corrected Left Eye Chart Acuity: 20/25   Both Eyes Visual Acuity: Corrected Both Eyes Chart Acuity: 20/25   Able To To Assessment.    Gloria was seen today for physical.    Diagnoses and all orders for this visit:    Encounter for annual health examination    Screening for cardiovascular condition  -     LIPID PANEL    Screening for colon cancer  -     Cancel: GASTRO - INTER MD [place], 9/14/2020     General Health     In the past six months, have you lost more than 10 pounds without trying?: 2 - No  Has your appetite been poor?: Yes  How does the patient maintain a good energy level?: Appropriate Exercise  How would you pastora There are no preventive care reminders to display for this patient. Update Health Maintenance if applicable    Chlamydia  Annually if high risk No results found for: CHLAMYDIA No flowsheet data found.     Screening Mammogram      Mammogram Annually to 75, t

## 2020-09-14 NOTE — PATIENT INSTRUCTIONS
Gloria Paz's SCREENING SCHEDULE   Tests on this list are recommended by your physician but may not be covered, or covered at this frequency, by your insurer. Please check with your insurance carrier before scheduling to verify coverage.    Vipul Ludwig Colonoscopy Screen   Covered every 10 years- more often if abnormal Colonoscopy due on 01/03/2004 Update Health Maintenance if applicable    Flex Sigmoidoscopy Screen  Covered every 5 years No results found for this or any previous visit.  No flowsheet data Pneumococcal 13 (Prevnar)  Covered Once after 65 Orders placed or performed in visit on 07/02/19   • PNEUMOCOCCAL VACC, 13 KATERINE IM    Please get once after your 65th birthday    Pneumococcal 23 (Pneumovax)  Covered Once after 65 No orders found for this or

## 2020-09-20 ENCOUNTER — APPOINTMENT (OUTPATIENT)
Dept: LAB | Facility: HOSPITAL | Age: 65
End: 2020-09-20
Attending: FAMILY MEDICINE
Payer: MEDICARE

## 2020-09-20 PROCEDURE — 82274 ASSAY TEST FOR BLOOD FECAL: CPT | Performed by: FAMILY MEDICINE

## 2020-09-24 LAB — HEMOCCULT STL QL: NEGATIVE

## 2021-01-08 ENCOUNTER — APPOINTMENT (OUTPATIENT)
Dept: CARDIOLOGY | Age: 66
End: 2021-01-08

## 2021-01-08 ENCOUNTER — OFFICE VISIT (OUTPATIENT)
Dept: CARDIOLOGY | Age: 66
End: 2021-01-08

## 2021-01-08 VITALS
BODY MASS INDEX: 26.54 KG/M2 | SYSTOLIC BLOOD PRESSURE: 130 MMHG | HEART RATE: 66 BPM | WEIGHT: 118 LBS | DIASTOLIC BLOOD PRESSURE: 62 MMHG | HEIGHT: 56 IN

## 2021-01-08 DIAGNOSIS — I10 ESSENTIAL HYPERTENSION: ICD-10-CM

## 2021-01-08 DIAGNOSIS — R94.39 ABNORMAL STRESS TEST: Primary | ICD-10-CM

## 2021-01-08 DIAGNOSIS — R01.1 MURMUR: ICD-10-CM

## 2021-01-08 DIAGNOSIS — E78.00 PURE HYPERCHOLESTEROLEMIA: ICD-10-CM

## 2021-01-08 PROCEDURE — 99214 OFFICE O/P EST MOD 30 MIN: CPT | Performed by: INTERNAL MEDICINE

## 2021-01-08 SDOH — HEALTH STABILITY: PHYSICAL HEALTH: ON AVERAGE, HOW MANY MINUTES DO YOU ENGAGE IN EXERCISE AT THIS LEVEL?: 30 MIN

## 2021-01-08 SDOH — SOCIAL STABILITY: SOCIAL NETWORK: ARE YOU MARRIED, WIDOWED, DIVORCED, SEPARATED, NEVER MARRIED, OR LIVING WITH A PARTNER?: MARRIED

## 2021-01-08 SDOH — HEALTH STABILITY: MENTAL HEALTH: HOW OFTEN DO YOU HAVE A DRINK CONTAINING ALCOHOL?: NEVER

## 2021-01-08 SDOH — HEALTH STABILITY: PHYSICAL HEALTH: ON AVERAGE, HOW MANY DAYS PER WEEK DO YOU ENGAGE IN MODERATE TO STRENUOUS EXERCISE (LIKE A BRISK WALK)?: 4 DAYS

## 2021-01-08 ASSESSMENT — ENCOUNTER SYMPTOMS
ALLERGIC/IMMUNOLOGIC COMMENTS: NO NEW FOOD ALLERGIES
BRUISES/BLEEDS EASILY: 0
CHILLS: 0
HEMATOCHEZIA: 0
WEIGHT GAIN: 0
WEIGHT LOSS: 0
SUSPICIOUS LESIONS: 0
HEMOPTYSIS: 0
FEVER: 0
COUGH: 0

## 2021-01-08 ASSESSMENT — PATIENT HEALTH QUESTIONNAIRE - PHQ9
2. FEELING DOWN, DEPRESSED OR HOPELESS: NOT AT ALL
SUM OF ALL RESPONSES TO PHQ9 QUESTIONS 1 AND 2: 0
SUM OF ALL RESPONSES TO PHQ9 QUESTIONS 1 AND 2: 0
CLINICAL INTERPRETATION OF PHQ2 SCORE: NO FURTHER SCREENING NEEDED
CLINICAL INTERPRETATION OF PHQ9 SCORE: NO FURTHER SCREENING NEEDED
1. LITTLE INTEREST OR PLEASURE IN DOING THINGS: NOT AT ALL

## 2021-01-21 ENCOUNTER — TELEPHONE (OUTPATIENT)
Dept: FAMILY MEDICINE CLINIC | Facility: CLINIC | Age: 66
End: 2021-01-21

## 2021-02-17 DIAGNOSIS — E78.00 PURE HYPERCHOLESTEROLEMIA: ICD-10-CM

## 2021-02-17 DIAGNOSIS — I10 ESSENTIAL HYPERTENSION: ICD-10-CM

## 2021-02-17 DIAGNOSIS — R00.2 PALPITATIONS: ICD-10-CM

## 2021-02-17 NOTE — TELEPHONE ENCOUNTER
LOV 9/14/2020    LAST LAB 9/1/2020    LAST RX   metoprolol Tartrate 25 MG Oral Tab 90 tablet 1 9/14/2020     Rosuvastatin Calcium 10 MG Oral Tab 90 tablet 1 9/14/2020     Losartan Potassium-HCTZ 50-12.5 MG Oral Tab 90 tablet 1 9/14/2020           Next OV N

## 2021-03-22 ENCOUNTER — TELEPHONE (OUTPATIENT)
Dept: FAMILY MEDICINE CLINIC | Facility: CLINIC | Age: 66
End: 2021-03-22

## 2021-03-22 NOTE — TELEPHONE ENCOUNTER
Please send lab orders to [facility] prior to upcoming appointment scheduled MA Supervisit on  4/12/2021.  she will call to schedule appt

## 2021-03-22 NOTE — TELEPHONE ENCOUNTER
Called and spoke to patient, pt inquiring if labs will be ordered prior to appt scheduled in April. Explained to pt that Dr. Bandar Parr will order labs during her visit. Pt verbalized understanding with no questions or concerns.

## 2021-03-26 DIAGNOSIS — E78.00 PURE HYPERCHOLESTEROLEMIA: ICD-10-CM

## 2021-03-26 DIAGNOSIS — R00.2 PALPITATIONS: ICD-10-CM

## 2021-03-26 DIAGNOSIS — I10 ESSENTIAL HYPERTENSION: ICD-10-CM

## 2021-03-26 RX ORDER — LOSARTAN POTASSIUM AND HYDROCHLOROTHIAZIDE 12.5; 5 MG/1; MG/1
1 TABLET ORAL DAILY
Qty: 90 TABLET | Refills: 0 | Status: SHIPPED | OUTPATIENT
Start: 2021-03-26 | End: 2021-04-12

## 2021-03-26 RX ORDER — ROSUVASTATIN CALCIUM 10 MG/1
10 TABLET, COATED ORAL NIGHTLY
Qty: 90 TABLET | Refills: 0 | Status: SHIPPED | OUTPATIENT
Start: 2021-03-26 | End: 2021-04-12

## 2021-03-26 NOTE — TELEPHONE ENCOUNTER
Pt calling stating that [facility] sent over a refill request for 3 of her medications. Pt states she has about a week's worth left. Also said that it takes [facility] about 10 days to send her prescriptions. Please advise.       metoprolol Tartrate 25 MG Oral Tab

## 2021-03-26 NOTE — TELEPHONE ENCOUNTER
LOV 9/14/20    LAST LAB 9/1/20    LAST RX 9/14/20    Future Appointments   Date Time Provider Clifton Lozanoi   4/12/2021  9:00 AM Carol Alvarado MD [facility] [address]       rx pended for your approval if ok. Please review and advise. Thank you.

## 2021-04-12 ENCOUNTER — OFFICE VISIT (OUTPATIENT)
Dept: FAMILY MEDICINE CLINIC | Facility: CLINIC | Age: 66
End: 2021-04-12
Payer: MEDICARE

## 2021-04-12 VITALS
RESPIRATION RATE: 18 BRPM | TEMPERATURE: 97 F | HEART RATE: 60 BPM | SYSTOLIC BLOOD PRESSURE: 120 MMHG | DIASTOLIC BLOOD PRESSURE: 80 MMHG | OXYGEN SATURATION: 98 % | HEIGHT: 58.27 IN | WEIGHT: 120 LBS | BODY MASS INDEX: 24.85 KG/M2

## 2021-04-12 DIAGNOSIS — Z78.0 POSTMENOPAUSAL: ICD-10-CM

## 2021-04-12 DIAGNOSIS — R00.2 PALPITATIONS: ICD-10-CM

## 2021-04-12 DIAGNOSIS — Z11.59 NEED FOR HEPATITIS C SCREENING TEST: ICD-10-CM

## 2021-04-12 DIAGNOSIS — M54.50 CHRONIC BILATERAL LOW BACK PAIN WITHOUT SCIATICA: ICD-10-CM

## 2021-04-12 DIAGNOSIS — I10 ESSENTIAL HYPERTENSION: ICD-10-CM

## 2021-04-12 DIAGNOSIS — G89.29 CHRONIC BILATERAL LOW BACK PAIN WITHOUT SCIATICA: ICD-10-CM

## 2021-04-12 DIAGNOSIS — Z12.11 SCREENING FOR COLON CANCER: ICD-10-CM

## 2021-04-12 DIAGNOSIS — Z13.6 SCREENING FOR CARDIOVASCULAR CONDITION: ICD-10-CM

## 2021-04-12 DIAGNOSIS — E78.00 PURE HYPERCHOLESTEROLEMIA: ICD-10-CM

## 2021-04-12 DIAGNOSIS — M25.512 ACUTE PAIN OF LEFT SHOULDER: ICD-10-CM

## 2021-04-12 DIAGNOSIS — N18.31 STAGE 3A CHRONIC KIDNEY DISEASE (HCC): ICD-10-CM

## 2021-04-12 DIAGNOSIS — Z12.31 VISIT FOR SCREENING MAMMOGRAM: ICD-10-CM

## 2021-04-12 DIAGNOSIS — Z00.00 ENCOUNTER FOR ANNUAL HEALTH EXAMINATION: Primary | ICD-10-CM

## 2021-04-12 PROCEDURE — G0439 PPPS, SUBSEQ VISIT: HCPCS | Performed by: FAMILY MEDICINE

## 2021-04-12 PROCEDURE — 99397 PER PM REEVAL EST PAT 65+ YR: CPT | Performed by: FAMILY MEDICINE

## 2021-04-12 PROCEDURE — 3008F BODY MASS INDEX DOCD: CPT | Performed by: FAMILY MEDICINE

## 2021-04-12 PROCEDURE — 96160 PT-FOCUSED HLTH RISK ASSMT: CPT | Performed by: FAMILY MEDICINE

## 2021-04-12 PROCEDURE — 3079F DIAST BP 80-89 MM HG: CPT | Performed by: FAMILY MEDICINE

## 2021-04-12 PROCEDURE — 3074F SYST BP LT 130 MM HG: CPT | Performed by: FAMILY MEDICINE

## 2021-04-12 RX ORDER — LOSARTAN POTASSIUM AND HYDROCHLOROTHIAZIDE 12.5; 5 MG/1; MG/1
1 TABLET ORAL DAILY
Qty: 90 TABLET | Refills: 0 | Status: SHIPPED | OUTPATIENT
Start: 2021-04-12 | End: 2021-09-06

## 2021-04-12 RX ORDER — ROSUVASTATIN CALCIUM 10 MG/1
10 TABLET, COATED ORAL NIGHTLY
Qty: 90 TABLET | Refills: 0 | Status: SHIPPED | OUTPATIENT
Start: 2021-04-12 | End: 2021-09-06

## 2021-04-12 NOTE — PATIENT INSTRUCTIONS
Gloria Paz's SCREENING SCHEDULE   Tests on this list are recommended by your physician but may not be covered, or covered at this frequency, by your insurer. Please check with your insurance carrier before scheduling to verify coverage.    Evelio Hein Colonoscopy Screen   Covered every 10 years- more often if abnormal Colonoscopy Never done Update Health Maintenance if applicable    Flex Sigmoidoscopy Screen  Covered every 5 years No results found for this or any previous visit. No flowsheet data found. in visit on 09/14/20   • FLU VACC HIGH DOSE PRSV FREE    Please get every year    Pneumococcal 13 (Prevnar)  Covered Once after 65 Orders placed or performed in visit on 07/02/19   • PNEUMOCOCCAL VACC, 13 KATERINE IM    Please get once after your 65th birthday [address] [facility] regarding Advance Directives.

## 2021-04-12 NOTE — PROGRESS NOTES
Patient presents with:  Physical    HPI:   Tamika Guadalupe is a 78year old female who presents for a MA (Medicare Advantage) [address] [facility] (Once per calendar year).     Annual Physical due on 04/12/2022      HTN: Patient is compliant with medication and GFR 30-59 ml/min (HCC)     Prediabetes     Anxiety     Psychophysiological insomnia     Murmur     Sciatica of left side    Wt Readings from Last 3 Encounters:  04/12/21 : 120 lb (54.4 kg)  09/14/20 : 117 lb (53.1 kg)  02/20/20 : 120 lb (54.4 kg)     Last chest tightness and shortness of breath. Cardiovascular: Negative for chest pain and palpitations. Gastrointestinal: Negative for nausea, abdominal pain, constipation and blood in stool.    Endocrine: Negative for cold intolerance, heat intolerance and normal.   Neck: Neck supple. No thyromegaly present. Cardiovascular: Normal rate, regular rhythm, normal heart sounds and intact distal pulses. No murmur heard. Edema not present. Carotid bruit not present.   Pulmonary/Chest: Effort normal and breath ANTIBODY    Essential hypertension well controlled  -     COMP METABOLIC PANEL (14)  -     metoprolol Tartrate 25 MG Oral Tab; Take 1 tablet (25 mg total) by mouth once daily.  -     Losartan Potassium-HCTZ 50-12.5 MG Oral Tab;  Take 1 tablet by mouth daily LDL Annually LDL-CHOLESTEROL (mg/dL (calc))   Date Value   09/01/2020 93        EKG - w/ Initial Preventative Physical Exam only, or if medically necessary Electrocardiogram date02/20/2020       Colorectal Cancer Screening      Colonoscopy Screen every Factor VIII or IX concentrates   Clients of institutions for the mentally retarded   Persons who live in the same house as a HepB virus carrier   Homosexual men   Illicit injectable drug abusers     Tetanus Toxoid  Only covered with a cut with metal- TD an

## 2021-04-18 PROBLEM — F51.04 PSYCHOPHYSIOLOGICAL INSOMNIA: Status: RESOLVED | Noted: 2020-02-20 | Resolved: 2021-04-18

## 2021-04-18 PROBLEM — R01.1 MURMUR: Status: RESOLVED | Noted: 2020-03-13 | Resolved: 2021-04-18

## 2021-05-14 RX ORDER — LOSARTAN POTASSIUM AND HYDROCHLOROTHIAZIDE 12.5; 5 MG/1; MG/1
TABLET ORAL
Qty: 90 TABLET | Refills: 0 | OUTPATIENT
Start: 2021-05-14

## 2021-05-14 RX ORDER — ROSUVASTATIN CALCIUM 10 MG/1
10 TABLET, COATED ORAL NIGHTLY
Qty: 90 TABLET | Refills: 0 | OUTPATIENT
Start: 2021-05-14

## 2021-09-02 DIAGNOSIS — I10 ESSENTIAL HYPERTENSION: ICD-10-CM

## 2021-09-02 DIAGNOSIS — R00.2 PALPITATIONS: ICD-10-CM

## 2021-09-02 DIAGNOSIS — E78.00 PURE HYPERCHOLESTEROLEMIA: ICD-10-CM

## 2021-09-03 NOTE — TELEPHONE ENCOUNTER
LOV 4/12/2021      LAST LAB 9/1/2020    LAST RX  Losartan Potassium-HCTZ 50-12.5 MG Oral Tab 90 tablet 0 4/12/2021     Rosuvastatin Calcium 10 MG Oral Tab 90 tablet 0 4/12/2021     metoprolol Tartrate 25 MG Oral Tab 90 tablet 0 4/12/2021             Next O

## 2021-09-06 RX ORDER — LOSARTAN POTASSIUM AND HYDROCHLOROTHIAZIDE 12.5; 5 MG/1; MG/1
TABLET ORAL
Qty: 90 TABLET | Refills: 0 | Status: SHIPPED | OUTPATIENT
Start: 2021-09-06 | End: 2022-01-04

## 2021-09-06 RX ORDER — ROSUVASTATIN CALCIUM 10 MG/1
TABLET, COATED ORAL
Qty: 90 TABLET | Refills: 0 | Status: SHIPPED | OUTPATIENT
Start: 2021-09-06 | End: 2022-01-04

## 2021-09-20 ENCOUNTER — TELEPHONE (OUTPATIENT)
Dept: FAMILY MEDICINE CLINIC | Facility: CLINIC | Age: 66
End: 2021-09-20

## 2021-09-20 NOTE — TELEPHONE ENCOUNTER
Patient asked if she can get covid booster shot. Asked her if she is immuno compromised or going through chemo. She stated no. Informed her that those are the only patients getting a third shot right now.     She said she wanted to speak with Dr. Sterling Newton

## 2021-09-21 NOTE — TELEPHONE ENCOUNTER
Dr. Ricarda Haynes,  Please advise if patient meets guidelines for booster dose    • 3rd dose for individuals ? 12 years and who have undergone solid organ transplantation, or diagnosed  with conditions considered to have an equivalent level of immunocompromise

## 2021-09-21 NOTE — TELEPHONE ENCOUNTER
Pt calling back regarding message below. . would only like to talk with Dr. Lulu Ruiz. Informed pt I would send message back and try to get an answer as soon as possible. Pt inquiring for her and her .  Please advise

## 2021-09-22 NOTE — TELEPHONE ENCOUNTER
Called pt to inform per PCP indicated no guidelines yet other than immunocompromised patients, 3rd dose is recommended after 8 months from the 2nd dose. No further questions or concerns. Pt verbalized understanding and agreed with POC.

## 2021-09-22 NOTE — TELEPHONE ENCOUNTER
No guidelines yet other than immunocompromised patients, 3rd dose is recommended after 8 months from the 2nd dose.

## 2021-11-01 DIAGNOSIS — I10 ESSENTIAL HYPERTENSION: ICD-10-CM

## 2021-11-01 DIAGNOSIS — E78.00 PURE HYPERCHOLESTEROLEMIA: ICD-10-CM

## 2021-11-01 DIAGNOSIS — R00.2 PALPITATIONS: ICD-10-CM

## 2021-11-02 RX ORDER — ROSUVASTATIN CALCIUM 10 MG/1
TABLET, COATED ORAL
Qty: 90 TABLET | Refills: 0 | OUTPATIENT
Start: 2021-11-02

## 2021-11-02 RX ORDER — LOSARTAN POTASSIUM AND HYDROCHLOROTHIAZIDE 12.5; 5 MG/1; MG/1
TABLET ORAL
Qty: 90 TABLET | Refills: 0 | OUTPATIENT
Start: 2021-11-02

## 2021-11-02 NOTE — TELEPHONE ENCOUNTER
LOV 4/12/2021    LAST LAB 9-1-20    LAST RX      Next OV No future appointments.     PROTOCOL   Name from pharmacy: LOSARTAN POTASSIUM/HYDROCHLOROTHIAZIDE 50-12.5 [address]         Will file in chart as: LOSARTAN-HYDROCHLOROTHIAZIDE 50-12.5 MG Oral Tab    S

## 2021-12-06 ENCOUNTER — MED REC SCAN ONLY (OUTPATIENT)
Dept: FAMILY MEDICINE CLINIC | Facility: CLINIC | Age: 66
End: 2021-12-06

## 2021-12-20 ENCOUNTER — TELEPHONE (OUTPATIENT)
Dept: FAMILY MEDICINE CLINIC | Facility: CLINIC | Age: 66
End: 2021-12-20

## 2021-12-20 DIAGNOSIS — I10 ESSENTIAL HYPERTENSION: Primary | ICD-10-CM

## 2021-12-20 DIAGNOSIS — Z11.59 ENCOUNTER FOR HEPATITIS C SCREENING TEST FOR LOW RISK PATIENT: ICD-10-CM

## 2021-12-20 DIAGNOSIS — E78.00 PURE HYPERCHOLESTEROLEMIA: ICD-10-CM

## 2022-01-04 DIAGNOSIS — R00.2 PALPITATIONS: ICD-10-CM

## 2022-01-04 DIAGNOSIS — I10 ESSENTIAL HYPERTENSION: ICD-10-CM

## 2022-01-04 DIAGNOSIS — E78.00 PURE HYPERCHOLESTEROLEMIA: ICD-10-CM

## 2022-01-04 NOTE — TELEPHONE ENCOUNTER
Pharmacy called to check status on refill request, said they faxed over on 12/28/22    Rosuvastatin  Metoprolol  Loartan

## 2022-01-04 NOTE — TELEPHONE ENCOUNTER
LOV: 4/12/21    Last refill: 9/6/21    Lab ordered 12/20/21- not completed. Future Appointments   Date Time Provider Clifton Lozanoi   1/13/2022  9:00 AM Telma Mcclelland MD [facility]       Rx's pended for your approval if ok.   Please review a

## 2022-01-05 RX ORDER — LOSARTAN POTASSIUM AND HYDROCHLOROTHIAZIDE 12.5; 5 MG/1; MG/1
1 TABLET ORAL DAILY
Qty: 90 TABLET | Refills: 0 | Status: SHIPPED | OUTPATIENT
Start: 2022-01-05 | End: 2022-01-13

## 2022-01-05 RX ORDER — ROSUVASTATIN CALCIUM 10 MG/1
10 TABLET, COATED ORAL NIGHTLY
Qty: 90 TABLET | Refills: 0 | Status: SHIPPED | OUTPATIENT
Start: 2022-01-05 | End: 2022-01-13

## 2022-01-06 LAB
ALBUMIN/GLOBULIN RATIO: 1.4 (CALC) (ref 1–2.5)
ALBUMIN: 4.2 G/DL (ref 3.6–5.1)
ALKALINE PHOSPHATASE: 68 U/L (ref 37–153)
ALT: 16 U/L (ref 6–29)
AST: 17 U/L (ref 10–35)
BILIRUBIN, TOTAL: 0.4 MG/DL (ref 0.2–1.2)
BUN/CREATININE RATIO: 12 (CALC) (ref 6–22)
BUN: 12 MG/DL (ref 7–25)
CALCIUM: 9.5 MG/DL (ref 8.6–10.4)
CARBON DIOXIDE: 27 MMOL/L (ref 20–32)
CHLORIDE: 101 MMOL/L (ref 98–110)
CHOL/HDLC RATIO: 3.1 (CALC)
CHOLESTEROL, TOTAL: 180 MG/DL
CREATININE: 1.01 MG/DL (ref 0.5–0.99)
EGFR IF AFRICN AM: 66 ML/MIN/1.73M2
EGFR IF NONAFRICN AM: 57 ML/MIN/1.73M2
GLOBULIN: 3 G/DL (CALC) (ref 1.9–3.7)
GLUCOSE: 90 MG/DL (ref 65–99)
HDL CHOLESTEROL: 58 MG/DL
LDL-CHOLESTEROL: 94 MG/DL (CALC)
NON-HDL CHOLESTEROL: 122 MG/DL (CALC)
POTASSIUM: 4.9 MMOL/L (ref 3.5–5.3)
PROTEIN, TOTAL: 7.2 G/DL (ref 6.1–8.1)
SIGNAL TO CUT-OFF: 0.08
SODIUM: 134 MMOL/L (ref 135–146)
TRIGLYCERIDES: 181 MG/DL

## 2022-01-13 ENCOUNTER — OFFICE VISIT (OUTPATIENT)
Dept: FAMILY MEDICINE CLINIC | Facility: CLINIC | Age: 67
End: 2022-01-13
Payer: MEDICARE

## 2022-01-13 VITALS
HEART RATE: 76 BPM | BODY MASS INDEX: 24.64 KG/M2 | DIASTOLIC BLOOD PRESSURE: 82 MMHG | HEIGHT: 58.27 IN | WEIGHT: 119 LBS | RESPIRATION RATE: 18 BRPM | TEMPERATURE: 97 F | OXYGEN SATURATION: 98 % | SYSTOLIC BLOOD PRESSURE: 136 MMHG

## 2022-01-13 DIAGNOSIS — Z78.0 POSTMENOPAUSAL: ICD-10-CM

## 2022-01-13 DIAGNOSIS — M25.561 CHRONIC PAIN OF RIGHT KNEE: ICD-10-CM

## 2022-01-13 DIAGNOSIS — Z00.00 ENCOUNTER FOR ANNUAL HEALTH EXAMINATION: Primary | ICD-10-CM

## 2022-01-13 DIAGNOSIS — Z13.1 SCREENING FOR DIABETES MELLITUS: ICD-10-CM

## 2022-01-13 DIAGNOSIS — Z13.29 SCREENING FOR THYROID DISORDER: ICD-10-CM

## 2022-01-13 DIAGNOSIS — R00.2 PALPITATIONS: ICD-10-CM

## 2022-01-13 DIAGNOSIS — L20.82 FLEXURAL ECZEMA: ICD-10-CM

## 2022-01-13 DIAGNOSIS — G89.29 CHRONIC BILATERAL LOW BACK PAIN WITHOUT SCIATICA: ICD-10-CM

## 2022-01-13 DIAGNOSIS — Z23 NEED FOR VACCINATION: ICD-10-CM

## 2022-01-13 DIAGNOSIS — Z13.0 SCREENING FOR IRON DEFICIENCY ANEMIA: ICD-10-CM

## 2022-01-13 DIAGNOSIS — N18.31 STAGE 3A CHRONIC KIDNEY DISEASE (HCC): ICD-10-CM

## 2022-01-13 DIAGNOSIS — Z12.31 VISIT FOR SCREENING MAMMOGRAM: ICD-10-CM

## 2022-01-13 DIAGNOSIS — M54.50 CHRONIC BILATERAL LOW BACK PAIN WITHOUT SCIATICA: ICD-10-CM

## 2022-01-13 DIAGNOSIS — I10 ESSENTIAL HYPERTENSION: ICD-10-CM

## 2022-01-13 DIAGNOSIS — G89.29 CHRONIC PAIN OF RIGHT KNEE: ICD-10-CM

## 2022-01-13 DIAGNOSIS — E78.00 PURE HYPERCHOLESTEROLEMIA: ICD-10-CM

## 2022-01-13 PROCEDURE — 99397 PER PM REEVAL EST PAT 65+ YR: CPT | Performed by: FAMILY MEDICINE

## 2022-01-13 PROCEDURE — 3075F SYST BP GE 130 - 139MM HG: CPT | Performed by: FAMILY MEDICINE

## 2022-01-13 PROCEDURE — 3008F BODY MASS INDEX DOCD: CPT | Performed by: FAMILY MEDICINE

## 2022-01-13 PROCEDURE — 96160 PT-FOCUSED HLTH RISK ASSMT: CPT | Performed by: FAMILY MEDICINE

## 2022-01-13 PROCEDURE — G0439 PPPS, SUBSEQ VISIT: HCPCS | Performed by: FAMILY MEDICINE

## 2022-01-13 PROCEDURE — 3079F DIAST BP 80-89 MM HG: CPT | Performed by: FAMILY MEDICINE

## 2022-01-13 RX ORDER — LOSARTAN POTASSIUM AND HYDROCHLOROTHIAZIDE 12.5; 5 MG/1; MG/1
1 TABLET ORAL DAILY
Qty: 90 TABLET | Refills: 0 | Status: SHIPPED | OUTPATIENT
Start: 2022-01-13

## 2022-01-13 RX ORDER — ROSUVASTATIN CALCIUM 10 MG/1
10 TABLET, COATED ORAL NIGHTLY
Qty: 90 TABLET | Refills: 0 | Status: SHIPPED | OUTPATIENT
Start: 2022-01-13

## 2022-01-13 NOTE — PROGRESS NOTES
Patient presents with:  Physical    HPI:   Sejal Calderón is a 75year old female who presents for a MA (Medicare Advantage) [address] [facility] (Once per calendar year).     Compliant with her blood pressure medication, states her blood pressure fluctuates an side    Wt Readings from Last 3 Encounters:  01/13/22 : 119 lb (54 kg)  04/12/21 : 120 lb (54.4 kg)  09/14/20 : 117 lb (53.1 kg)     Last Cholesterol Labs:   Lab Results   Component Value Date    CHOLEST 180 01/05/2022    HDL 58 01/05/2022    LDL 94 01/05/ for nausea, abdominal pain, constipation and blood in stool. Endocrine: Negative for cold intolerance, heat intolerance and polyuria. Genitourinary: Negative for dysuria, urgency, frequency and difficulty urinating.    Musculoskeletal: Positive for join heard.   Edema not present. Carotid bruit not present. Pulmonary/Chest: Effort normal and breath sounds normal. No respiratory distress. She exhibits no tenderness. Abdominal: Soft. Bowel sounds are normal. She exhibits no distension and no mass.  There i by mouth nightly.  -advised to continue to take the same dose of medication    Essential hypertension controlled  -     metoprolol tartrate 25 MG Oral Tab; Take 1 tablet (25 mg total) by mouth daily.   -     losartan-hydroCHLOROthiazide 50-12.5 MG Oral Tab; LAST COMPLETION DATE   Diabetes Screening    Fasting Blood Sugar /  Glucose    One screening every 12 months if never tested or if previously tested but not diagnosed with pre-diabetes   One screening every 6 months if diagnosed with pre-diabetes Lab Resul annually for all female patients aged 36 and older    One baseline mammogram covered for patients aged 32-38 07/15/2019    Mammogram due on 07/15/2020    Immunizations    Influenza Covered once per flu season  Please get every year 11/11/2021  No recommend

## 2022-01-13 NOTE — PATIENT INSTRUCTIONS
Gloria Paz's SCREENING SCHEDULE   Tests on this list are recommended by your physician but may not be covered, or covered at this frequency, by your insurer. Please check with your insurance carrier before scheduling to verify coverage.    PRE 07/15/2019      No recommendations at this time   Pap and Pelvic    Pap   Covered every 2 years for women at normal risk;  Annually if at high risk -  No recommendations at this time    Chlamydia Annually if high risk 07/30/2021  No recommendations at this http://www. idph.state. [place]/public/books/advin.htm  A link to the [employer]. This site has a lot of good information including definitions of the different types of Advance Directives.  It also has the State forms available on it's webs

## 2022-02-02 ENCOUNTER — HOSPITAL ENCOUNTER (OUTPATIENT)
Dept: BONE DENSITY | Age: 67
Discharge: HOME | End: 2022-02-02
Attending: FAMILY MEDICINE
Payer: MEDICARE

## 2022-02-02 ENCOUNTER — HOSPITAL ENCOUNTER (OUTPATIENT)
Dept: MAMMOGRAPHY | Age: 67
Discharge: HOME | End: 2022-02-02
Attending: FAMILY MEDICINE
Payer: MEDICARE

## 2022-02-02 ENCOUNTER — HOSPITAL ENCOUNTER (OUTPATIENT)
Dept: GENERAL RADIOLOGY | Age: 67
Discharge: HOME | End: 2022-02-02
Attending: FAMILY MEDICINE
Payer: MEDICARE

## 2022-02-02 DIAGNOSIS — M25.561 CHRONIC PAIN OF RIGHT KNEE: ICD-10-CM

## 2022-02-02 DIAGNOSIS — Z78.0 POSTMENOPAUSAL: ICD-10-CM

## 2022-02-02 DIAGNOSIS — Z12.31 VISIT FOR SCREENING MAMMOGRAM: ICD-10-CM

## 2022-02-02 DIAGNOSIS — G89.29 CHRONIC PAIN OF RIGHT KNEE: ICD-10-CM

## 2022-02-02 PROCEDURE — 77063 BREAST TOMOSYNTHESIS BI: CPT | Performed by: FAMILY MEDICINE

## 2022-02-02 PROCEDURE — 77080 DXA BONE DENSITY AXIAL: CPT | Performed by: FAMILY MEDICINE

## 2022-02-02 PROCEDURE — 77067 SCR MAMMO BI INCL CAD: CPT | Performed by: FAMILY MEDICINE

## 2022-02-02 PROCEDURE — 73564 X-RAY EXAM KNEE 4 OR MORE: CPT | Performed by: FAMILY MEDICINE

## 2022-02-10 ENCOUNTER — TELEPHONE (OUTPATIENT)
Dept: ORTHOPEDICS CLINIC | Facility: CLINIC | Age: 67
End: 2022-02-10

## 2022-02-10 NOTE — TELEPHONE ENCOUNTER
Patient has an appointment scheduled with Dr. Shahnaz Ortiz on 2/14 for right knee pain. Patient had imaging taken and is in epic. Please advise if additional imaging is needed.

## 2022-02-14 ENCOUNTER — OFFICE VISIT (OUTPATIENT)
Dept: ORTHOPEDICS CLINIC | Facility: CLINIC | Age: 67
End: 2022-02-14
Payer: MEDICARE

## 2022-02-14 VITALS — BODY MASS INDEX: 27.77 KG/M2 | WEIGHT: 120 LBS | HEIGHT: 55 IN

## 2022-02-14 DIAGNOSIS — M17.11 PRIMARY OSTEOARTHRITIS OF RIGHT KNEE: Primary | ICD-10-CM

## 2022-02-14 PROCEDURE — 99204 OFFICE O/P NEW MOD 45 MIN: CPT | Performed by: ORTHOPAEDIC SURGERY

## 2022-02-14 PROCEDURE — 3008F BODY MASS INDEX DOCD: CPT | Performed by: ORTHOPAEDIC SURGERY

## 2022-02-22 ENCOUNTER — MED REC SCAN ONLY (OUTPATIENT)
Dept: FAMILY MEDICINE CLINIC | Facility: CLINIC | Age: 67
End: 2022-02-22

## 2022-03-22 RX ORDER — ROSUVASTATIN CALCIUM 10 MG/1
10 TABLET, COATED ORAL NIGHTLY
Qty: 90 TABLET | Refills: 0 | OUTPATIENT
Start: 2022-03-22

## 2022-06-12 DIAGNOSIS — E78.00 PURE HYPERCHOLESTEROLEMIA: ICD-10-CM

## 2022-06-12 DIAGNOSIS — I10 ESSENTIAL HYPERTENSION: ICD-10-CM

## 2022-06-12 DIAGNOSIS — R00.2 PALPITATIONS: ICD-10-CM

## 2022-06-13 ENCOUNTER — TELEPHONE (OUTPATIENT)
Dept: FAMILY MEDICINE CLINIC | Facility: CLINIC | Age: 67
End: 2022-06-13

## 2022-06-13 NOTE — TELEPHONE ENCOUNTER
[facility] requesting return call to nurse. Patient returned call and states her B/P has been higher than usual for the past 2-3 weeks. Has been taking all medication as ordered. In the AM it runs 130s/60-70,  In the evenings it runs as high as 170s/60-70. HR 55-70. States in the am she feels a little dizzy and off balance. In the evenings when it is higher she gets a HA. Occasionally has slight nausea. Feels more fatigued. Denies any CP/tightness or SOB. Has upcoming six month F/U in July. Scheduled tomorrow for B/P check. Advised to bring her B/P monitor with her.     Future Appointments   Date Time Provider Clifton Kiersten   6/14/2022  3:00 PM Sasha Williamson MD [facility]   7/14/2022  9:20 AM Sasha Williamson MD [facility]

## 2022-06-14 ENCOUNTER — OFFICE VISIT (OUTPATIENT)
Dept: FAMILY MEDICINE CLINIC | Facility: CLINIC | Age: 67
End: 2022-06-14
Payer: MEDICARE

## 2022-06-14 VITALS
DIASTOLIC BLOOD PRESSURE: 62 MMHG | WEIGHT: 121 LBS | OXYGEN SATURATION: 98 % | HEIGHT: 55 IN | RESPIRATION RATE: 18 BRPM | BODY MASS INDEX: 28 KG/M2 | HEART RATE: 76 BPM | TEMPERATURE: 98 F | SYSTOLIC BLOOD PRESSURE: 122 MMHG

## 2022-06-14 DIAGNOSIS — I10 ESSENTIAL HYPERTENSION: ICD-10-CM

## 2022-06-14 DIAGNOSIS — E78.00 PURE HYPERCHOLESTEROLEMIA: ICD-10-CM

## 2022-06-14 DIAGNOSIS — R00.2 PALPITATIONS: ICD-10-CM

## 2022-06-14 PROCEDURE — 3074F SYST BP LT 130 MM HG: CPT | Performed by: FAMILY MEDICINE

## 2022-06-14 PROCEDURE — 99214 OFFICE O/P EST MOD 30 MIN: CPT | Performed by: FAMILY MEDICINE

## 2022-06-14 PROCEDURE — 3008F BODY MASS INDEX DOCD: CPT | Performed by: FAMILY MEDICINE

## 2022-06-14 PROCEDURE — 3078F DIAST BP <80 MM HG: CPT | Performed by: FAMILY MEDICINE

## 2022-06-14 RX ORDER — CALCIUM CARBONATE/VITAMIN D3 500 MG-10
1 TABLET ORAL DAILY
COMMUNITY
Start: 2022-02-16

## 2022-06-14 RX ORDER — AMLODIPINE BESYLATE 5 MG/1
5 TABLET ORAL DAILY
Qty: 30 TABLET | Refills: 0 | Status: SHIPPED | OUTPATIENT
Start: 2022-06-14 | End: 2022-06-15

## 2022-06-15 RX ORDER — AMLODIPINE BESYLATE 5 MG/1
TABLET ORAL
Qty: 90 TABLET | Refills: 0 | Status: SHIPPED | OUTPATIENT
Start: 2022-06-15 | End: 2022-06-16

## 2022-06-16 RX ORDER — ROSUVASTATIN CALCIUM 10 MG/1
TABLET, COATED ORAL
Qty: 90 TABLET | Refills: 0 | OUTPATIENT
Start: 2022-06-16

## 2022-06-16 RX ORDER — ROSUVASTATIN CALCIUM 10 MG/1
10 TABLET, COATED ORAL NIGHTLY
Qty: 90 TABLET | Refills: 1 | Status: SHIPPED | OUTPATIENT
Start: 2022-06-16

## 2022-06-16 RX ORDER — AMLODIPINE BESYLATE 5 MG/1
5 TABLET ORAL DAILY
Qty: 90 TABLET | Refills: 1 | Status: SHIPPED | OUTPATIENT
Start: 2022-06-16

## 2022-06-16 RX ORDER — LOSARTAN POTASSIUM AND HYDROCHLOROTHIAZIDE 12.5; 5 MG/1; MG/1
1 TABLET ORAL DAILY
Qty: 90 TABLET | Refills: 1 | Status: SHIPPED | OUTPATIENT
Start: 2022-06-16

## 2022-06-16 RX ORDER — LOSARTAN POTASSIUM AND HYDROCHLOROTHIAZIDE 12.5; 5 MG/1; MG/1
TABLET ORAL
Qty: 90 TABLET | Refills: 0 | OUTPATIENT
Start: 2022-06-16

## 2022-07-01 ENCOUNTER — TELEPHONE (OUTPATIENT)
Dept: FAMILY MEDICINE CLINIC | Facility: CLINIC | Age: 67
End: 2022-07-01

## 2022-07-01 LAB
ABSOLUTE BASOPHILS: 49 CELLS/UL (ref 0–200)
ABSOLUTE EOSINOPHILS: 221 CELLS/UL (ref 15–500)
ABSOLUTE LYMPHOCYTES: 1017 CELLS/UL (ref 850–3900)
ABSOLUTE MONOCYTES: 1009 CELLS/UL (ref 200–950)
ABSOLUTE NEUTROPHILS: 5904 CELLS/UL (ref 1500–7800)
ALBUMIN/GLOBULIN RATIO: 1.3 (CALC) (ref 1–2.5)
ALBUMIN: 4.4 G/DL (ref 3.6–5.1)
ALKALINE PHOSPHATASE: 63 U/L (ref 37–153)
ALT: 22 U/L (ref 6–29)
AST: 24 U/L (ref 10–35)
BASOPHILS: 0.6 %
BILIRUBIN, TOTAL: 0.4 MG/DL (ref 0.2–1.2)
BUN/CREATININE RATIO: 10 (CALC) (ref 6–22)
BUN: 11 MG/DL (ref 7–25)
CALCIUM: 10.2 MG/DL (ref 8.6–10.4)
CARBON DIOXIDE: 27 MMOL/L (ref 20–32)
CHLORIDE: 97 MMOL/L (ref 98–110)
CREATININE: 1.07 MG/DL (ref 0.5–0.99)
EGFR IF AFRICN AM: 62 ML/MIN/1.73M2
EGFR IF NONAFRICN AM: 53 ML/MIN/1.73M2
EOSINOPHILS: 2.7 %
GLOBULIN: 3.3 G/DL (CALC) (ref 1.9–3.7)
GLUCOSE: 103 MG/DL (ref 65–99)
HEMATOCRIT: 36.9 % (ref 35–45)
HEMOGLOBIN A1C: 6.1 % OF TOTAL HGB
HEMOGLOBIN: 12.1 G/DL (ref 11.7–15.5)
LYMPHOCYTES: 12.4 %
MCH: 27.7 PG (ref 27–33)
MCHC: 32.8 G/DL (ref 32–36)
MCV: 84.4 FL (ref 80–100)
MONOCYTES: 12.3 %
MPV: 10.3 FL (ref 7.5–12.5)
NEUTROPHILS: 72 %
PLATELET COUNT: 284 THOUSAND/UL (ref 140–400)
POTASSIUM: 4.7 MMOL/L (ref 3.5–5.3)
PROTEIN, TOTAL: 7.7 G/DL (ref 6.1–8.1)
RDW: 13 % (ref 11–15)
RED BLOOD CELL COUNT: 4.37 MILLION/UL (ref 3.8–5.1)
SODIUM: 132 MMOL/L (ref 135–146)
TSH W/REFLEX TO FT4: 4.2 MIU/L (ref 0.4–4.5)
WHITE BLOOD CELL COUNT: 8.2 THOUSAND/UL (ref 3.8–10.8)

## 2022-07-01 NOTE — TELEPHONE ENCOUNTER
Called and spoke to patient who states she went to a picnic on Sunday with about 500 people attending. On Tuesday experienced shivering and sore throat. Did a covid test 6/30/22 that was negative. Now has a slightly congested sounding cough and feels very fatigues. Denies ever having fever. Denies runny nose, SOB, N/V/D or any other symptoms. Advised can repeat covid test today to R/O covid. Explained symptomatic treatment for viral illness. Encouraged to push fluids, warm tea w honey/lemon and rest.  Return call if any worsening of symptoms.     Future Appointments   Date Time Provider Clifton Burch   7/5/2022  9:40 AM Rodney Denson MD [facility]

## 2022-07-01 NOTE — TELEPHONE ENCOUNTER
Pt has been feeling sick for three days. Congestion, cough, mucous, fatique. Tested  Negative yesterday. Pls call patient back.

## 2022-07-05 ENCOUNTER — OFFICE VISIT (OUTPATIENT)
Dept: FAMILY MEDICINE CLINIC | Facility: CLINIC | Age: 67
End: 2022-07-05
Payer: MEDICARE

## 2022-07-05 VITALS
WEIGHT: 118 LBS | BODY MASS INDEX: 27.31 KG/M2 | HEIGHT: 55 IN | HEART RATE: 60 BPM | OXYGEN SATURATION: 98 % | DIASTOLIC BLOOD PRESSURE: 64 MMHG | TEMPERATURE: 98 F | RESPIRATION RATE: 18 BRPM | SYSTOLIC BLOOD PRESSURE: 118 MMHG

## 2022-07-05 DIAGNOSIS — F41.9 ANXIETY: ICD-10-CM

## 2022-07-05 DIAGNOSIS — Z00.00 ENCOUNTER FOR ANNUAL HEALTH EXAMINATION: Primary | ICD-10-CM

## 2022-07-05 DIAGNOSIS — I10 ESSENTIAL HYPERTENSION: ICD-10-CM

## 2022-07-05 DIAGNOSIS — M54.32 SCIATICA OF LEFT SIDE: ICD-10-CM

## 2022-07-05 DIAGNOSIS — Z23 NEED FOR PNEUMOCOCCAL VACCINATION: ICD-10-CM

## 2022-07-05 DIAGNOSIS — J06.9 VIRAL UPPER RESPIRATORY TRACT INFECTION: ICD-10-CM

## 2022-07-05 DIAGNOSIS — N18.31 STAGE 3A CHRONIC KIDNEY DISEASE (HCC): Chronic | ICD-10-CM

## 2022-07-05 DIAGNOSIS — E78.00 PURE HYPERCHOLESTEROLEMIA: ICD-10-CM

## 2022-07-05 DIAGNOSIS — R73.03 PREDIABETES: ICD-10-CM

## 2022-07-05 PROCEDURE — 3074F SYST BP LT 130 MM HG: CPT | Performed by: FAMILY MEDICINE

## 2022-07-05 PROCEDURE — 1126F AMNT PAIN NOTED NONE PRSNT: CPT | Performed by: FAMILY MEDICINE

## 2022-07-05 PROCEDURE — G0009 ADMIN PNEUMOCOCCAL VACCINE: HCPCS | Performed by: FAMILY MEDICINE

## 2022-07-05 PROCEDURE — 90677 PCV20 VACCINE IM: CPT | Performed by: FAMILY MEDICINE

## 2022-07-05 PROCEDURE — 99397 PER PM REEVAL EST PAT 65+ YR: CPT | Performed by: FAMILY MEDICINE

## 2022-07-05 PROCEDURE — G0439 PPPS, SUBSEQ VISIT: HCPCS | Performed by: FAMILY MEDICINE

## 2022-07-05 PROCEDURE — 3078F DIAST BP <80 MM HG: CPT | Performed by: FAMILY MEDICINE

## 2022-07-05 PROCEDURE — 96160 PT-FOCUSED HLTH RISK ASSMT: CPT | Performed by: FAMILY MEDICINE

## 2022-07-05 PROCEDURE — 3008F BODY MASS INDEX DOCD: CPT | Performed by: FAMILY MEDICINE

## 2022-07-06 ENCOUNTER — MED REC SCAN ONLY (OUTPATIENT)
Dept: FAMILY MEDICINE CLINIC | Facility: CLINIC | Age: 67
End: 2022-07-06

## 2022-08-22 ENCOUNTER — MED REC SCAN ONLY (OUTPATIENT)
Dept: FAMILY MEDICINE CLINIC | Facility: CLINIC | Age: 67
End: 2022-08-22

## 2022-10-04 NOTE — TELEPHONE ENCOUNTER
Prior Auth started for brand Crestor. negative normal/normal affect/alert and oriented x3/normal behavior

## 2022-11-05 DIAGNOSIS — I10 ESSENTIAL HYPERTENSION: ICD-10-CM

## 2022-11-05 DIAGNOSIS — E78.00 PURE HYPERCHOLESTEROLEMIA: ICD-10-CM

## 2022-11-05 DIAGNOSIS — R00.2 PALPITATIONS: ICD-10-CM

## 2022-11-09 RX ORDER — LOSARTAN POTASSIUM AND HYDROCHLOROTHIAZIDE 12.5; 5 MG/1; MG/1
TABLET ORAL
Qty: 90 TABLET | Refills: 1 | Status: SHIPPED | OUTPATIENT
Start: 2022-11-09

## 2022-11-09 RX ORDER — ROSUVASTATIN CALCIUM 10 MG/1
TABLET, COATED ORAL
Qty: 90 TABLET | Refills: 1 | Status: SHIPPED | OUTPATIENT
Start: 2022-11-09

## 2022-11-09 RX ORDER — AMLODIPINE BESYLATE 5 MG/1
TABLET ORAL
Qty: 90 TABLET | Refills: 1 | Status: SHIPPED | OUTPATIENT
Start: 2022-11-09

## 2022-11-21 ENCOUNTER — TELEPHONE (OUTPATIENT)
Dept: FAMILY MEDICINE CLINIC | Facility: CLINIC | Age: 67
End: 2022-11-21

## 2022-11-21 NOTE — TELEPHONE ENCOUNTER
Spoke with patient - name and  verified. She states having worse HA, no fever, no runny nose. Complain of congestion, cough and sore throat. Covid was negative. She also cough up some green mucus at times. She was referred to [facility]. She was provided with [facility] address and number.

## 2022-11-22 ENCOUNTER — OFFICE VISIT (OUTPATIENT)
Dept: FAMILY MEDICINE CLINIC | Facility: CLINIC | Age: 67
End: 2022-11-22
Payer: MEDICARE

## 2022-11-22 VITALS
RESPIRATION RATE: 18 BRPM | HEART RATE: 71 BPM | BODY MASS INDEX: 25.19 KG/M2 | TEMPERATURE: 98 F | HEIGHT: 58 IN | SYSTOLIC BLOOD PRESSURE: 124 MMHG | OXYGEN SATURATION: 99 % | WEIGHT: 120 LBS | DIASTOLIC BLOOD PRESSURE: 68 MMHG

## 2022-11-22 DIAGNOSIS — J20.9 ACUTE BRONCHITIS, UNSPECIFIED ORGANISM: Primary | ICD-10-CM

## 2022-11-22 DIAGNOSIS — R68.89 FLU-LIKE SYMPTOMS: ICD-10-CM

## 2022-11-22 PROCEDURE — 99213 OFFICE O/P EST LOW 20 MIN: CPT | Performed by: FAMILY MEDICINE

## 2022-11-22 PROCEDURE — 87637 SARSCOV2&INF A&B&RSV AMP PRB: CPT | Performed by: FAMILY MEDICINE

## 2022-11-22 RX ORDER — PREDNISONE 20 MG/1
40 TABLET ORAL DAILY
Qty: 10 TABLET | Refills: 0 | Status: SHIPPED | OUTPATIENT
Start: 2022-11-22 | End: 2022-11-27

## 2022-11-22 RX ORDER — ALBUTEROL SULFATE 90 UG/1
AEROSOL, METERED RESPIRATORY (INHALATION)
Qty: 1 EACH | Refills: 0 | Status: SHIPPED | OUTPATIENT
Start: 2022-11-22

## 2022-11-23 LAB
FLUAV + FLUBV RNA SPEC NAA+PROBE: DETECTED
FLUAV + FLUBV RNA SPEC NAA+PROBE: NOT DETECTED
RSV RNA SPEC NAA+PROBE: NOT DETECTED
SARS-COV-2 RNA RESP QL NAA+PROBE: NOT DETECTED

## 2022-11-30 ENCOUNTER — MED REC SCAN ONLY (OUTPATIENT)
Dept: FAMILY MEDICINE CLINIC | Facility: CLINIC | Age: 67
End: 2022-11-30

## 2023-01-18 ENCOUNTER — TELEPHONE (OUTPATIENT)
Dept: FAMILY MEDICINE CLINIC | Facility: CLINIC | Age: 68
End: 2023-01-18

## 2023-01-18 NOTE — TELEPHONE ENCOUNTER
Pt calling stating she recently had her covid booster at the beginning of January and her arm and hand are still hurting.  She would like to come in for appt please advise

## 2023-01-18 NOTE — TELEPHONE ENCOUNTER
Reports: since receiving COVID Booster - on 1/3/23 - (Carlos Ordonez)   Left hand hurting after receiving booster shot. Reports:    Intermittent pain  \"like I took injection right now\"  Shoulder pain radiating to hand       Denies:  Swelling  Redness  Warm to touch  Any recent injury to area. Please advise on appointment as no upcomming availability.

## 2023-01-19 ENCOUNTER — OFFICE VISIT (OUTPATIENT)
Dept: FAMILY MEDICINE CLINIC | Facility: CLINIC | Age: 68
End: 2023-01-19
Payer: MEDICARE

## 2023-01-19 VITALS
HEIGHT: 58 IN | TEMPERATURE: 97 F | SYSTOLIC BLOOD PRESSURE: 128 MMHG | DIASTOLIC BLOOD PRESSURE: 60 MMHG | WEIGHT: 121 LBS | BODY MASS INDEX: 25.4 KG/M2 | HEART RATE: 64 BPM | RESPIRATION RATE: 16 BRPM | OXYGEN SATURATION: 99 %

## 2023-01-19 DIAGNOSIS — M99.08 SOMATIC DYSFUNCTION OF RIB CAGE REGION: ICD-10-CM

## 2023-01-19 DIAGNOSIS — M54.9 UPPER BACK PAIN ON LEFT SIDE: Primary | ICD-10-CM

## 2023-01-19 DIAGNOSIS — M99.02 SOMATIC DYSFUNCTION OF THORACIC REGION: ICD-10-CM

## 2023-01-19 PROCEDURE — 3078F DIAST BP <80 MM HG: CPT | Performed by: FAMILY MEDICINE

## 2023-01-19 PROCEDURE — 3008F BODY MASS INDEX DOCD: CPT | Performed by: FAMILY MEDICINE

## 2023-01-19 PROCEDURE — 98925 OSTEOPATH MANJ 1-2 REGIONS: CPT | Performed by: FAMILY MEDICINE

## 2023-01-19 PROCEDURE — 3074F SYST BP LT 130 MM HG: CPT | Performed by: FAMILY MEDICINE

## 2023-01-19 PROCEDURE — 99213 OFFICE O/P EST LOW 20 MIN: CPT | Performed by: FAMILY MEDICINE

## 2023-01-19 NOTE — TELEPHONE ENCOUNTER
Future Appointments   Date Time Provider Clifton Burch   1/19/2023  3:40 PM Ava Red DO [facility]

## 2023-01-19 NOTE — TELEPHONE ENCOUNTER
Patient calling for an update regarding her issues after receiving her booster vaccine. Please triage and advise.

## 2023-01-23 ENCOUNTER — TELEPHONE (OUTPATIENT)
Dept: FAMILY MEDICINE CLINIC | Facility: CLINIC | Age: 68
End: 2023-01-23

## 2023-01-23 DIAGNOSIS — M54.6 ACUTE LEFT-SIDED THORACIC BACK PAIN: Primary | ICD-10-CM

## 2023-01-23 NOTE — TELEPHONE ENCOUNTER
Patient is here today with her , requesting a referral for PT, states saw  and was advised PT but did not get a referral.    PT referral placed.

## 2023-01-27 ENCOUNTER — TELEPHONE (OUTPATIENT)
Dept: PHYSICAL THERAPY | Facility: HOSPITAL | Age: 68
End: 2023-01-27

## 2023-01-31 ENCOUNTER — TELEPHONE (OUTPATIENT)
Dept: PHYSICAL THERAPY | Facility: HOSPITAL | Age: 68
End: 2023-01-31

## 2023-02-01 ENCOUNTER — APPOINTMENT (OUTPATIENT)
Dept: PHYSICAL THERAPY | Facility: HOSPITAL | Age: 68
End: 2023-02-01
Attending: FAMILY MEDICINE
Payer: MEDICARE

## 2023-02-06 ENCOUNTER — OFFICE VISIT (OUTPATIENT)
Dept: PHYSICAL THERAPY | Facility: HOSPITAL | Age: 68
End: 2023-02-06
Attending: FAMILY MEDICINE
Payer: MEDICARE

## 2023-02-06 PROCEDURE — 97110 THERAPEUTIC EXERCISES: CPT

## 2023-02-06 PROCEDURE — 97161 PT EVAL LOW COMPLEX 20 MIN: CPT

## 2023-02-13 ENCOUNTER — OFFICE VISIT (OUTPATIENT)
Dept: PHYSICAL THERAPY | Facility: HOSPITAL | Age: 68
End: 2023-02-13
Attending: FAMILY MEDICINE
Payer: MEDICARE

## 2023-02-13 ENCOUNTER — TELEPHONE (OUTPATIENT)
Dept: PHYSICAL THERAPY | Facility: HOSPITAL | Age: 68
End: 2023-02-13

## 2023-02-13 PROCEDURE — 97110 THERAPEUTIC EXERCISES: CPT

## 2023-02-13 PROCEDURE — 97140 MANUAL THERAPY 1/> REGIONS: CPT

## 2023-02-15 ENCOUNTER — OFFICE VISIT (OUTPATIENT)
Dept: PHYSICAL THERAPY | Facility: HOSPITAL | Age: 68
End: 2023-02-15
Attending: FAMILY MEDICINE
Payer: MEDICARE

## 2023-02-15 PROCEDURE — 97140 MANUAL THERAPY 1/> REGIONS: CPT

## 2023-02-15 PROCEDURE — 97110 THERAPEUTIC EXERCISES: CPT

## 2023-02-20 ENCOUNTER — APPOINTMENT (OUTPATIENT)
Dept: PHYSICAL THERAPY | Facility: HOSPITAL | Age: 68
End: 2023-02-20
Attending: FAMILY MEDICINE
Payer: MEDICARE

## 2023-02-27 ENCOUNTER — OFFICE VISIT (OUTPATIENT)
Dept: PHYSICAL THERAPY | Facility: HOSPITAL | Age: 68
End: 2023-02-27
Attending: FAMILY MEDICINE
Payer: MEDICARE

## 2023-02-27 PROCEDURE — 97110 THERAPEUTIC EXERCISES: CPT

## 2023-02-27 PROCEDURE — 97140 MANUAL THERAPY 1/> REGIONS: CPT

## 2023-02-28 ENCOUNTER — TELEPHONE (OUTPATIENT)
Dept: PHYSICAL THERAPY | Facility: HOSPITAL | Age: 68
End: 2023-02-28

## 2023-03-01 ENCOUNTER — APPOINTMENT (OUTPATIENT)
Dept: PHYSICAL THERAPY | Facility: HOSPITAL | Age: 68
End: 2023-03-01
Attending: FAMILY MEDICINE
Payer: MEDICARE

## 2023-03-06 ENCOUNTER — OFFICE VISIT (OUTPATIENT)
Dept: PHYSICAL THERAPY | Facility: HOSPITAL | Age: 68
End: 2023-03-06
Attending: FAMILY MEDICINE
Payer: MEDICARE

## 2023-03-06 DIAGNOSIS — M54.6 ACUTE LEFT-SIDED THORACIC BACK PAIN: ICD-10-CM

## 2023-03-06 PROCEDURE — 97110 THERAPEUTIC EXERCISES: CPT

## 2023-03-06 PROCEDURE — 97140 MANUAL THERAPY 1/> REGIONS: CPT

## 2023-03-09 ENCOUNTER — TELEPHONE (OUTPATIENT)
Dept: FAMILY MEDICINE CLINIC | Facility: CLINIC | Age: 68
End: 2023-03-09

## 2023-03-09 NOTE — TELEPHONE ENCOUNTER
Called pt stating has been experiencing intermittent dry cough, sore throat from cough, slight wheezing, also intermittent ear and neck pain, and HA x3 months. No CP. No SOB. No dizziness. No fever/ chills. No congestion. No difficulty swallowing. No neck swelling or stiffness. No N/V. No abd pain. No bladder or bowel issues. No known covid exposure. Tested negative for covid 4 days ago. Tried OTC Dayquil, Nquil, Advil, cold & sinus, vicks vapor on chest, nose, head, with no improvements. Recommend to keep very well hydrated. OTC Delsym for cough. Hot tea with honey or lemon, cepacol lozenges, warm salt water gargles. OTC Tylenol/ibuprofen as needed for pain. OTC Claritin, Allegra or Zyrtec. Warm compress to affected ear. OTC Flonase, may help dry up any possible fluid build up causing pressure. No availabilty with PCP until end of this month. Offered and scheduled sooner OV with Dr. Maylene Severs on Monday (3/13) @4:00pm, will coming in with  with same sx. Informed will relay to PCP, if able to see pt next week, will call pt back. Advised if sx worsen or develops any fever, chest discomfort/ pain, SOB, dizziness, vomiting, to go to ER/ UC. No further questions or concerns. Pt verbalized understanding and agreed with POC. DAVINA

## 2023-03-13 ENCOUNTER — OFFICE VISIT (OUTPATIENT)
Dept: FAMILY MEDICINE CLINIC | Facility: CLINIC | Age: 68
End: 2023-03-13
Payer: MEDICARE

## 2023-03-13 VITALS
SYSTOLIC BLOOD PRESSURE: 124 MMHG | WEIGHT: 122 LBS | HEIGHT: 58 IN | OXYGEN SATURATION: 98 % | HEART RATE: 62 BPM | DIASTOLIC BLOOD PRESSURE: 82 MMHG | RESPIRATION RATE: 18 BRPM | BODY MASS INDEX: 25.61 KG/M2 | TEMPERATURE: 98 F

## 2023-03-13 DIAGNOSIS — J30.9 ALLERGIC RHINITIS, UNSPECIFIED SEASONALITY, UNSPECIFIED TRIGGER: ICD-10-CM

## 2023-03-13 DIAGNOSIS — R09.82 POST-NASAL DRIP: Primary | ICD-10-CM

## 2023-03-13 PROCEDURE — 3079F DIAST BP 80-89 MM HG: CPT | Performed by: FAMILY MEDICINE

## 2023-03-13 PROCEDURE — 3074F SYST BP LT 130 MM HG: CPT | Performed by: FAMILY MEDICINE

## 2023-03-13 PROCEDURE — 99213 OFFICE O/P EST LOW 20 MIN: CPT | Performed by: FAMILY MEDICINE

## 2023-03-13 PROCEDURE — 3008F BODY MASS INDEX DOCD: CPT | Performed by: FAMILY MEDICINE

## 2023-03-13 RX ORDER — CETIRIZINE HYDROCHLORIDE 10 MG/1
10 TABLET ORAL DAILY
Qty: 30 TABLET | Refills: 1 | Status: SHIPPED | OUTPATIENT
Start: 2023-03-13

## 2023-03-13 RX ORDER — FLUTICASONE PROPIONATE 50 MCG
1 SPRAY, SUSPENSION (ML) NASAL DAILY
Qty: 16 G | Refills: 1 | Status: SHIPPED | OUTPATIENT
Start: 2023-03-13

## 2023-03-13 RX ORDER — BENZONATATE 200 MG/1
200 CAPSULE ORAL 3 TIMES DAILY PRN
Qty: 30 CAPSULE | Refills: 0 | Status: SHIPPED | OUTPATIENT
Start: 2023-03-13

## 2023-03-13 NOTE — PATIENT INSTRUCTIONS
Post-nasal Drainage-     Breathe steam or heated humidified air to open blocked nasal passages.  a hot shower or use a vaporizer. Be careful not to get burned by the steam.  Saline nasal sprays and decongestant tablets help open a stuffy nose. Use Flonase (Available Over-the counter) 2 puffs daily   Take Antihistamine to prevent mucus production-Zyrtec, Allegra, or Claritin. Gargle every 2 hours with 1/4 teaspoon of salt dissolved in 1/2 cup of warm water. In the evening before bed and first thing the morning. Suck on throat lozenges and cough drops to moisten your throat.

## 2023-03-15 ENCOUNTER — OFFICE VISIT (OUTPATIENT)
Dept: PHYSICAL THERAPY | Facility: HOSPITAL | Age: 68
End: 2023-03-15
Attending: FAMILY MEDICINE
Payer: MEDICARE

## 2023-03-15 PROCEDURE — 97140 MANUAL THERAPY 1/> REGIONS: CPT

## 2023-03-15 PROCEDURE — 97110 THERAPEUTIC EXERCISES: CPT

## 2023-03-20 ENCOUNTER — TELEPHONE (OUTPATIENT)
Dept: PHYSICAL THERAPY | Facility: HOSPITAL | Age: 68
End: 2023-03-20

## 2023-03-29 ENCOUNTER — APPOINTMENT (OUTPATIENT)
Dept: PHYSICAL THERAPY | Facility: HOSPITAL | Age: 68
End: 2023-03-29
Attending: FAMILY MEDICINE
Payer: MEDICARE

## 2023-03-29 ENCOUNTER — TELEPHONE (OUTPATIENT)
Dept: PHYSICAL THERAPY | Facility: HOSPITAL | Age: 68
End: 2023-03-29

## 2023-04-03 ENCOUNTER — OFFICE VISIT (OUTPATIENT)
Dept: PHYSICAL THERAPY | Facility: HOSPITAL | Age: 68
End: 2023-04-03
Attending: FAMILY MEDICINE
Payer: MEDICARE

## 2023-04-03 PROCEDURE — 97140 MANUAL THERAPY 1/> REGIONS: CPT

## 2023-04-03 PROCEDURE — 97110 THERAPEUTIC EXERCISES: CPT

## 2023-05-03 ENCOUNTER — TELEPHONE (OUTPATIENT)
Dept: PHYSICAL THERAPY | Facility: HOSPITAL | Age: 68
End: 2023-05-03

## 2023-05-03 ENCOUNTER — APPOINTMENT (OUTPATIENT)
Dept: PHYSICAL THERAPY | Facility: HOSPITAL | Age: 68
End: 2023-05-03
Attending: FAMILY MEDICINE
Payer: MEDICARE

## 2023-05-05 LAB — AMB EXT OCCULT BLOOD RESULT: NEGATIVE

## 2023-05-26 ENCOUNTER — TELEPHONE (OUTPATIENT)
Dept: FAMILY MEDICINE CLINIC | Facility: CLINIC | Age: 68
End: 2023-05-26

## 2023-05-31 ENCOUNTER — MED REC SCAN ONLY (OUTPATIENT)
Dept: FAMILY MEDICINE CLINIC | Facility: CLINIC | Age: 68
End: 2023-05-31

## 2023-06-17 DIAGNOSIS — E78.00 PURE HYPERCHOLESTEROLEMIA: ICD-10-CM

## 2023-06-17 DIAGNOSIS — R00.2 PALPITATIONS: ICD-10-CM

## 2023-06-17 DIAGNOSIS — I10 ESSENTIAL HYPERTENSION: ICD-10-CM

## 2023-06-17 RX ORDER — LOSARTAN POTASSIUM AND HYDROCHLOROTHIAZIDE 12.5; 5 MG/1; MG/1
TABLET ORAL
Qty: 30 TABLET | Refills: 0 | Status: SHIPPED | OUTPATIENT
Start: 2023-06-17

## 2023-06-17 RX ORDER — ROSUVASTATIN CALCIUM 10 MG/1
TABLET, COATED ORAL
Qty: 30 TABLET | Refills: 0 | Status: SHIPPED | OUTPATIENT
Start: 2023-06-17

## 2023-07-05 ENCOUNTER — TELEPHONE (OUTPATIENT)
Dept: FAMILY MEDICINE CLINIC | Facility: CLINIC | Age: 68
End: 2023-07-05

## 2023-07-05 DIAGNOSIS — N18.31 STAGE 3A CHRONIC KIDNEY DISEASE (HCC): Primary | Chronic | ICD-10-CM

## 2023-07-05 DIAGNOSIS — I10 ESSENTIAL HYPERTENSION: ICD-10-CM

## 2023-07-05 DIAGNOSIS — E78.00 PURE HYPERCHOLESTEROLEMIA: ICD-10-CM

## 2023-07-05 DIAGNOSIS — R73.03 PREDIABETES: ICD-10-CM

## 2023-07-05 DIAGNOSIS — Z13.0 SCREENING FOR DEFICIENCY ANEMIA: ICD-10-CM

## 2023-07-05 DIAGNOSIS — E55.9 VITAMIN D DEFICIENCY: ICD-10-CM

## 2023-07-05 DIAGNOSIS — Z13.29 SCREENING FOR THYROID DISORDER: ICD-10-CM

## 2023-07-05 NOTE — TELEPHONE ENCOUNTER
Dr. Larry Pang,  please advise if any labs due prior to [address]. LOV 3/13/23   PND  +1  (Dr. Dayne Brown)   1/19/23   Back pain  +2  (Dr. Dayne Brown)   7/5/22    Annual  PE  +8    Last Labs 6/30/22       Deveron Burkitt, MD  7/6/2022  8:33 AM CDT Back to Top      Hemoglobin A1c is in the prediabetic range, stable chronic kidney disease, monocytes mildly elevated, sodium and chloride are slightly low labs discussed with patient at appointment. Patient to repeat labs in 6 months.      Future Appointments   Date Time Provider Clifton Burch   7/24/2023 10:20 AM Myesha Le MD [facility]

## 2023-07-18 LAB
ABSOLUTE BASOPHILS: 57 CELLS/UL (ref 0–200)
ABSOLUTE EOSINOPHILS: 328 CELLS/UL (ref 15–500)
ABSOLUTE LYMPHOCYTES: 2140 CELLS/UL (ref 850–3900)
ABSOLUTE MONOCYTES: 730 CELLS/UL (ref 200–950)
ABSOLUTE NEUTROPHILS: 4945 CELLS/UL (ref 1500–7800)
ALBUMIN/GLOBULIN RATIO: 1.3 (CALC) (ref 1–2.5)
ALBUMIN: 4 G/DL (ref 3.6–5.1)
ALKALINE PHOSPHATASE: 67 U/L (ref 37–153)
ALT: 16 U/L (ref 6–29)
AST: 20 U/L (ref 10–35)
BASOPHILS: 0.7 %
BILIRUBIN, TOTAL: 0.3 MG/DL (ref 0.2–1.2)
BUN: 14 MG/DL (ref 7–25)
CALCIUM: 9.5 MG/DL (ref 8.6–10.4)
CARBON DIOXIDE: 25 MMOL/L (ref 20–32)
CHLORIDE: 97 MMOL/L (ref 98–110)
CHOL/HDLC RATIO: 2.6 (CALC)
CHOLESTEROL, TOTAL: 169 MG/DL
CREATININE: 1 MG/DL (ref 0.5–1.05)
EGFR: 61 ML/MIN/1.73M2
EOSINOPHILS: 4 %
GLOBULIN: 3 G/DL (CALC) (ref 1.9–3.7)
GLUCOSE: 103 MG/DL (ref 65–99)
HDL CHOLESTEROL: 65 MG/DL
HEMATOCRIT: 33.7 % (ref 35–45)
HEMOGLOBIN: 11.2 G/DL (ref 11.7–15.5)
LDL-CHOLESTEROL: 83 MG/DL (CALC)
LYMPHOCYTES: 26.1 %
MCH: 27.1 PG (ref 27–33)
MCHC: 33.2 G/DL (ref 32–36)
MCV: 81.6 FL (ref 80–100)
MONOCYTES: 8.9 %
MPV: 11.2 FL (ref 7.5–12.5)
NEUTROPHILS: 60.3 %
NON-HDL CHOLESTEROL: 104 MG/DL (CALC)
PLATELET COUNT: 281 THOUSAND/UL (ref 140–400)
POTASSIUM: 4.7 MMOL/L (ref 3.5–5.3)
PROTEIN, TOTAL: 7 G/DL (ref 6.1–8.1)
RDW: 13.1 % (ref 11–15)
RED BLOOD CELL COUNT: 4.13 MILLION/UL (ref 3.8–5.1)
SODIUM: 133 MMOL/L (ref 135–146)
TRIGLYCERIDES: 117 MG/DL
TSH: 4.98 MIU/L (ref 0.4–4.5)
VITAMIN B12: 721 PG/ML (ref 200–1100)
VITAMIN D, 25-OH, TOTAL: 34 NG/ML (ref 30–100)
WHITE BLOOD CELL COUNT: 8.2 THOUSAND/UL (ref 3.8–10.8)

## 2023-07-24 ENCOUNTER — OFFICE VISIT (OUTPATIENT)
Dept: FAMILY MEDICINE CLINIC | Facility: CLINIC | Age: 68
End: 2023-07-24
Payer: MEDICARE

## 2023-07-24 VITALS
HEIGHT: 58 IN | WEIGHT: 122 LBS | TEMPERATURE: 98 F | HEART RATE: 72 BPM | BODY MASS INDEX: 25.61 KG/M2 | OXYGEN SATURATION: 98 % | RESPIRATION RATE: 18 BRPM | SYSTOLIC BLOOD PRESSURE: 120 MMHG | DIASTOLIC BLOOD PRESSURE: 76 MMHG

## 2023-07-24 DIAGNOSIS — Z00.00 ENCOUNTER FOR ANNUAL HEALTH EXAMINATION: Primary | ICD-10-CM

## 2023-07-24 DIAGNOSIS — E78.00 PURE HYPERCHOLESTEROLEMIA: ICD-10-CM

## 2023-07-24 DIAGNOSIS — Z12.31 ENCOUNTER FOR SCREENING MAMMOGRAM FOR MALIGNANT NEOPLASM OF BREAST: ICD-10-CM

## 2023-07-24 DIAGNOSIS — R79.89 ABNORMAL THYROID BLOOD TEST: ICD-10-CM

## 2023-07-24 DIAGNOSIS — R73.03 PREDIABETES: ICD-10-CM

## 2023-07-24 DIAGNOSIS — I10 ESSENTIAL HYPERTENSION: ICD-10-CM

## 2023-07-24 DIAGNOSIS — R00.2 PALPITATIONS: ICD-10-CM

## 2023-07-24 RX ORDER — LOSARTAN POTASSIUM AND HYDROCHLOROTHIAZIDE 12.5; 5 MG/1; MG/1
1 TABLET ORAL DAILY
Qty: 90 TABLET | Refills: 1 | Status: SHIPPED | OUTPATIENT
Start: 2023-07-24

## 2023-07-24 RX ORDER — AMLODIPINE BESYLATE 5 MG/1
5 TABLET ORAL DAILY
Qty: 90 TABLET | Refills: 1 | Status: SHIPPED | OUTPATIENT
Start: 2023-07-24

## 2023-07-24 RX ORDER — ROSUVASTATIN CALCIUM 10 MG/1
10 TABLET, COATED ORAL NIGHTLY
Qty: 90 TABLET | Refills: 1 | Status: SHIPPED | OUTPATIENT
Start: 2023-07-24

## 2023-09-20 ENCOUNTER — OFFICE VISIT (OUTPATIENT)
Dept: FAMILY MEDICINE CLINIC | Facility: CLINIC | Age: 68
End: 2023-09-20
Payer: MEDICARE

## 2023-09-20 VITALS
SYSTOLIC BLOOD PRESSURE: 138 MMHG | HEART RATE: 58 BPM | TEMPERATURE: 98 F | RESPIRATION RATE: 16 BRPM | OXYGEN SATURATION: 98 % | WEIGHT: 122 LBS | DIASTOLIC BLOOD PRESSURE: 68 MMHG | HEIGHT: 58 IN | BODY MASS INDEX: 25.61 KG/M2

## 2023-09-20 DIAGNOSIS — M17.12 PRIMARY OSTEOARTHRITIS OF LEFT KNEE: Primary | ICD-10-CM

## 2023-09-20 DIAGNOSIS — M25.472 LEFT ANKLE SWELLING: ICD-10-CM

## 2023-09-20 DIAGNOSIS — Z23 NEED FOR VACCINATION: ICD-10-CM

## 2023-09-20 DIAGNOSIS — I10 ESSENTIAL HYPERTENSION: ICD-10-CM

## 2023-09-20 DIAGNOSIS — J02.9 ACUTE PHARYNGITIS, UNSPECIFIED ETIOLOGY: ICD-10-CM

## 2023-09-20 LAB
CONTROL LINE PRESENT WITH A CLEAR BACKGROUND (YES/NO): YES YES/NO
COVID19 BINAX NOW ANTIGEN: NOT DETECTED
KIT EXPIRATION DATE: NORMAL DATE
KIT LOT #: NORMAL NUMERIC
OPERATOR ID: NORMAL
POCT EXPIRATION DATE: NORMAL
STREP GRP A CUL-SCR: NEGATIVE

## 2023-09-20 PROCEDURE — 3008F BODY MASS INDEX DOCD: CPT | Performed by: FAMILY MEDICINE

## 2023-09-20 PROCEDURE — 1159F MED LIST DOCD IN RCRD: CPT | Performed by: FAMILY MEDICINE

## 2023-09-20 PROCEDURE — 1170F FXNL STATUS ASSESSED: CPT | Performed by: FAMILY MEDICINE

## 2023-09-20 PROCEDURE — 87880 STREP A ASSAY W/OPTIC: CPT | Performed by: FAMILY MEDICINE

## 2023-09-20 PROCEDURE — 3078F DIAST BP <80 MM HG: CPT | Performed by: FAMILY MEDICINE

## 2023-09-20 PROCEDURE — 1160F RVW MEDS BY RX/DR IN RCRD: CPT | Performed by: FAMILY MEDICINE

## 2023-09-20 PROCEDURE — 90662 IIV NO PRSV INCREASED AG IM: CPT | Performed by: FAMILY MEDICINE

## 2023-09-20 PROCEDURE — 99214 OFFICE O/P EST MOD 30 MIN: CPT | Performed by: FAMILY MEDICINE

## 2023-09-20 PROCEDURE — 3075F SYST BP GE 130 - 139MM HG: CPT | Performed by: FAMILY MEDICINE

## 2023-09-20 PROCEDURE — G0008 ADMIN INFLUENZA VIRUS VAC: HCPCS | Performed by: FAMILY MEDICINE

## 2023-09-20 RX ORDER — MELOXICAM 7.5 MG/1
7.5 TABLET ORAL DAILY
Qty: 30 TABLET | Refills: 0 | Status: SHIPPED | OUTPATIENT
Start: 2023-09-20

## 2023-11-08 ENCOUNTER — TELEPHONE (OUTPATIENT)
Dept: FAMILY MEDICINE CLINIC | Facility: CLINIC | Age: 68
End: 2023-11-08

## 2023-11-08 NOTE — TELEPHONE ENCOUNTER
Spoke to patient who states she has been having a cough for a week.  also has cough and had symptoms before her. Drinking fluids, but is not taking any cough medicine. States she coughs so hard it makes her back hurt. Advised to push fluids, warm tea w honey/lemon, steam tx, Delsym cough syrup, cool mist humidifier. Can take tylenol or alternate tylenol with ibuprofen for back pain. Can also try cool or heat packs to back.

## 2023-12-25 DIAGNOSIS — R00.2 PALPITATIONS: ICD-10-CM

## 2023-12-25 DIAGNOSIS — I10 ESSENTIAL HYPERTENSION: ICD-10-CM

## 2023-12-25 DIAGNOSIS — E78.00 PURE HYPERCHOLESTEROLEMIA: ICD-10-CM

## 2023-12-27 RX ORDER — ROSUVASTATIN CALCIUM 10 MG/1
10 TABLET, COATED ORAL NIGHTLY
Qty: 90 TABLET | Refills: 3 | OUTPATIENT
Start: 2023-12-27

## 2023-12-27 RX ORDER — LOSARTAN POTASSIUM AND HYDROCHLOROTHIAZIDE 12.5; 5 MG/1; MG/1
1 TABLET ORAL DAILY
Qty: 90 TABLET | Refills: 3 | OUTPATIENT
Start: 2023-12-27

## 2024-01-17 ENCOUNTER — TELEPHONE (OUTPATIENT)
Dept: FAMILY MEDICINE CLINIC | Facility: CLINIC | Age: 69
End: 2024-01-17

## 2024-01-17 NOTE — TELEPHONE ENCOUNTER
Faxed [facility] lab orders to [facility] fax #[phone] -- pt was waiting @ [facility] -- received confirmation fax.

## 2024-01-18 DIAGNOSIS — I10 ESSENTIAL HYPERTENSION: ICD-10-CM

## 2024-01-18 LAB
ALBUMIN/GLOBULIN RATIO: 1.2 (CALC) (ref 1–2.5)
ALBUMIN: 4.1 G/DL (ref 3.6–5.1)
ALKALINE PHOSPHATASE: 69 U/L (ref 37–153)
ALT: 19 U/L (ref 6–29)
AST: 19 U/L (ref 10–35)
BILIRUBIN, TOTAL: 0.4 MG/DL (ref 0.2–1.2)
BUN: 13 MG/DL (ref 7–25)
CALCIUM: 9.9 MG/DL (ref 8.6–10.4)
CARBON DIOXIDE: 27 MMOL/L (ref 20–32)
CHLORIDE: 99 MMOL/L (ref 98–110)
CHOL/HDLC RATIO: 2.9 (CALC)
CHOLESTEROL, TOTAL: 176 MG/DL
CREATININE: 1 MG/DL (ref 0.6–1)
EGFR: 61 ML/MIN/1.73M2
GLOBULIN: 3.3 G/DL (CALC) (ref 1.9–3.7)
GLUCOSE: 97 MG/DL (ref 65–99)
HDL CHOLESTEROL: 61 MG/DL
HEMOGLOBIN A1C: 6 % OF TOTAL HGB
LDL-CHOLESTEROL: 91 MG/DL (CALC)
NON-HDL CHOLESTEROL: 115 MG/DL (CALC)
POTASSIUM: 4.6 MMOL/L (ref 3.5–5.3)
PROTEIN, TOTAL: 7.4 G/DL (ref 6.1–8.1)
SODIUM: 134 MMOL/L (ref 135–146)
T3, FREE: 3.3 PG/ML (ref 2.3–4.2)
T4, FREE: 1.1 NG/DL (ref 0.8–1.8)
THYROGLOBULIN ANTIBODIES: 2 IU/ML
THYROID PEROXIDASE$ANTIBODIES: <1 IU/ML
TRIGLYCERIDES: 137 MG/DL
TSH: 3.11 MIU/L (ref 0.4–4.5)

## 2024-01-18 RX ORDER — AMLODIPINE BESYLATE 5 MG/1
5 TABLET ORAL DAILY
Qty: 90 TABLET | Refills: 0 | Status: SHIPPED | OUTPATIENT
Start: 2024-01-18

## 2024-01-18 NOTE — TELEPHONE ENCOUNTER
Hypertension Medications Protocol [id number]/18/2024 10:34 AM   Protocol Details CMP or BMP in past 12 months    Last serum creatinine< 2.0    Appointment in past 6 or next 3 months        LOV  9/20/23     LAST LAB 7/18/23     LAST RX  7/24/23 90 with 1     Next OV   Future Appointments   Date Time Provider Department Center   1/24/2024 10:20 AM Nava Guillory MD [facility]         PROTOCOL pass

## 2024-01-24 ENCOUNTER — TELEPHONE (OUTPATIENT)
Dept: PHYSICAL THERAPY | Facility: HOSPITAL | Age: 69
End: 2024-01-24

## 2024-01-24 ENCOUNTER — OFFICE VISIT (OUTPATIENT)
Dept: FAMILY MEDICINE CLINIC | Facility: CLINIC | Age: 69
End: 2024-01-24
Payer: MEDICARE

## 2024-01-24 VITALS
BODY MASS INDEX: 25.68 KG/M2 | RESPIRATION RATE: 18 BRPM | WEIGHT: 124 LBS | HEIGHT: 58.27 IN | DIASTOLIC BLOOD PRESSURE: 82 MMHG | TEMPERATURE: 98 F | OXYGEN SATURATION: 98 % | SYSTOLIC BLOOD PRESSURE: 138 MMHG | HEART RATE: 66 BPM

## 2024-01-24 DIAGNOSIS — M25.512 PAIN OF LEFT SHOULDER REGION: ICD-10-CM

## 2024-01-24 DIAGNOSIS — I10 ESSENTIAL HYPERTENSION: ICD-10-CM

## 2024-01-24 DIAGNOSIS — M79.10 MYALGIA: ICD-10-CM

## 2024-01-24 DIAGNOSIS — R00.2 PALPITATIONS: ICD-10-CM

## 2024-01-24 DIAGNOSIS — Z00.00 ENCOUNTER FOR ANNUAL HEALTH EXAMINATION: Primary | ICD-10-CM

## 2024-01-24 DIAGNOSIS — J30.1 SEASONAL ALLERGIC RHINITIS DUE TO POLLEN: ICD-10-CM

## 2024-01-24 DIAGNOSIS — R73.03 PREDIABETES: ICD-10-CM

## 2024-01-24 DIAGNOSIS — Z78.0 POSTMENOPAUSAL: ICD-10-CM

## 2024-01-24 DIAGNOSIS — E78.00 PURE HYPERCHOLESTEROLEMIA: ICD-10-CM

## 2024-01-24 DIAGNOSIS — Z12.31 VISIT FOR SCREENING MAMMOGRAM: ICD-10-CM

## 2024-01-24 DIAGNOSIS — N18.31 STAGE 3A CHRONIC KIDNEY DISEASE (HCC): Chronic | ICD-10-CM

## 2024-01-24 DIAGNOSIS — M54.2 NECK PAIN: ICD-10-CM

## 2024-01-24 DIAGNOSIS — G47.09 OTHER INSOMNIA: ICD-10-CM

## 2024-01-24 PROCEDURE — 3008F BODY MASS INDEX DOCD: CPT | Performed by: FAMILY MEDICINE

## 2024-01-24 PROCEDURE — G0439 PPPS, SUBSEQ VISIT: HCPCS | Performed by: FAMILY MEDICINE

## 2024-01-24 PROCEDURE — 1125F AMNT PAIN NOTED PAIN PRSNT: CPT | Performed by: FAMILY MEDICINE

## 2024-01-24 PROCEDURE — 1159F MED LIST DOCD IN RCRD: CPT | Performed by: FAMILY MEDICINE

## 2024-01-24 PROCEDURE — 96160 PT-FOCUSED HLTH RISK ASSMT: CPT | Performed by: FAMILY MEDICINE

## 2024-01-24 PROCEDURE — 99213 OFFICE O/P EST LOW 20 MIN: CPT | Performed by: FAMILY MEDICINE

## 2024-01-24 PROCEDURE — 99499 UNLISTED E&M SERVICE: CPT | Performed by: FAMILY MEDICINE

## 2024-01-24 PROCEDURE — 3075F SYST BP GE 130 - 139MM HG: CPT | Performed by: FAMILY MEDICINE

## 2024-01-24 PROCEDURE — 3079F DIAST BP 80-89 MM HG: CPT | Performed by: FAMILY MEDICINE

## 2024-01-24 PROCEDURE — 1170F FXNL STATUS ASSESSED: CPT | Performed by: FAMILY MEDICINE

## 2024-01-24 RX ORDER — AMLODIPINE BESYLATE 5 MG/1
5 TABLET ORAL DAILY
Qty: 90 TABLET | Refills: 0 | Status: SHIPPED | OUTPATIENT
Start: 2024-01-24

## 2024-01-24 RX ORDER — LOSARTAN POTASSIUM AND HYDROCHLOROTHIAZIDE 12.5; 5 MG/1; MG/1
1 TABLET ORAL DAILY
Qty: 90 TABLET | Refills: 0 | Status: SHIPPED | OUTPATIENT
Start: 2024-01-24

## 2024-01-24 RX ORDER — ROSUVASTATIN CALCIUM 10 MG/1
10 TABLET, COATED ORAL NIGHTLY
Qty: 90 TABLET | Refills: 0 | Status: SHIPPED | OUTPATIENT
Start: 2024-01-24

## 2024-01-24 NOTE — PATIENT INSTRUCTIONS
Gloria Paz's SCREENING SCHEDULE   Tests on this list are recommended by your physician but may not be covered, or covered at this frequency, by your insurer.   Please check with your insurance carrier before scheduling to verify coverage.   PREVENTATIVE SERVICES FREQUENCY &  COVERAGE DETAILS LAST COMPLETION DATE   Diabetes Screening    Fasting Blood Sugar /  Glucose    One screening every 12 months if never tested or if previously tested but not diagnosed with pre-diabetes   One screening every 6 months if diagnosed with pre-diabetes Lab Results   Component Value Date    GLU 97 01/17/2024        Cardiovascular Disease Screening    Lipid Panel  Cholesterol  Lipoprotein (HDL)  Triglycerides Covered every 5 years for all Medicare beneficiaries without apparent signs or symptoms of cardiovascular disease Lab Results   Component Value Date    CHOLEST 176 01/17/2024    HDL 61 01/17/2024    LDL 91 01/17/2024    TRIG 137 01/17/2024         Electrocardiogram (EKG)   Covered if needed at [facility] to [facility], and non-screening if indicated for medical reasons 02/24/2020      Ultrasound Screening for Abdominal Aortic Aneurysm (AAA) Covered once in a lifetime for one of the following risk factors    Men who are 65-75 years old and have ever smoked    Anyone with a family history -     Colorectal Cancer Screening  Covered for ages 50-85; only need ONE of the following:    Colonoscopy   Covered every 10 years    Covered every 2 years if patient is at high risk or previous colonoscopy was abnormal -    Health Maintenance   Topic Date Due    Colorectal Cancer Screening  05/05/2024       Flexible Sigmoidoscopy   Covered every 4 years -    Fecal Occult Blood Test Covered annually 05/05/2023   Bone Density Screening    Bone density screening    Covered every 2 years after age 65 if diagnosed with risk of osteoporosis or estrogen deficiency.    Covered yearly for long-term glucocorticoid medication use (Steroids) Last Dexa  Scan:    XR DEXA BONE DENSITOMETRY (CPT=77080) 02/02/2022      No recommendations at this time   Pap and Pelvic    Pap   Covered every 2 years for women at normal risk; Annually if at high risk -  No recommendations at this time    Chlamydia Annually if high risk 05/05/2023  No recommendations at this time   Screening Mammogram    Mammogram     Recommend annually for all female patients aged 40 and older    One baseline mammogram covered for patients aged 35-39 02/02/2022    Health Maintenance   Topic Date Due    Mammogram  02/02/2023       Immunizations    Influenza Covered once per flu season  Please get every year 09/20/2023  No recommendations at this time    Pneumococcal Each vaccine (Uoirhyo82 & Ggojqzpod24) covered once after 65 Prevnar 13: 07/02/2019    Hyfodamhb64: -     No recommendations at this time    Hepatitis B One screening covered for patients with certain risk factors   -  No recommendations at this time    Tetanus Toxoid Not covered by Medicare Part B unless medically necessary (cut with metal); may be covered with your pharmacy prescription benefits -    Tetanus, Diptheria and Pertusis TD and TDaP Not covered by Medicare Part B -  No recommendations at this time    Zoster Not covered by Medicare Part B; may be covered with your pharmacy  prescription benefits -  Zoster Vaccines(1 of 2) Never done     Annual Monitoring of Persistent Medications (ACE/ARB, digoxin diuretics, anticonvulsants)    Potassium Annually Lab Results   Component Value Date    K 4.6 01/17/2024         Creatinine   Annually Lab Results   Component Value Date    CREATSERUM 1.00 01/17/2024         BUN Annually Lab Results   Component Value Date    BUN 13 01/17/2024       Drug Serum Conc Annually No results found for: \"DIGOXIN\", \"DIG\", \"VALP\"       Vitamin D3 over the counter 2000IU daily    Zyrtec 10 mg daily.

## 2024-01-24 NOTE — PROGRESS NOTES
Chief Complaint   Patient presents with    Physical    Sleep Problem       Subjective:   Gloria Paz is a 69 year old female who presents for a MA (Medicare Advantage) Supervisit (Once per calendar year) and scheduled follow up of multiple significant but stable problems.   Does not do breast self exam, previous mammogram has been normal  Did FIT test in may, no constipation or blood in stool, no family history of colon cancer.    Has sleep concerns, states sometimes will wake up and has trouble falling back  asleep.    Pain in the neck and right shoulder area    History/Other:   Fall Risk Assessment:   She has been screened for Falls and is low risk.      Cognitive Assessment:   She had a completely normal cognitive assessment - see flowsheet entries     Functional Ability/Status:   Gloria Paz has some abnormal functions as listed below:  She has Hearing problems based on screening of functional status.      Depression Screening (PHQ-2/PHQ-9): PHQ-2 SCORE: 0  , done 1/24/2024             Advanced Directives:   She does NOT have a Living Will. [Do you have a living will?: No]  She does NOT have a Power of  for Health Care. [Do you have a healthcare power of ?: No]  Discussed Advance Care Planning with patient (and family/surrogate if present). Standard forms made available to patient in After Visit Summary.      Patient Active Problem List   Diagnosis    Pure hypercholesterolemia    Essential hypertension    CKD (chronic kidney disease) stage 3, GFR 30-59 ml/min ([facility])    Prediabetes    Anxiety    Sciatica of left side     Allergies:  She is allergic to atorvastatin.    Current Medications:  Outpatient Medications Marked as Taking for the 1/24/24 encounter (Office Visit) with Nava Guillory MD   Medication Sig    amLODIPine 5 MG Oral Tab Take 1 tablet (5 mg total) by mouth daily.    losartan-hydroCHLOROthiazide 50-12.5 MG Oral Tab Take 1 tablet by mouth daily.    metoprolol  tartrate 25 MG Oral Tab Take 1 tablet (25 mg total) by mouth daily.    rosuvastatin 10 MG Oral Tab Take 1 tablet (10 mg total) by mouth nightly.       Medical History:  She  has a past medical history of Eczema, Other and unspecified hyperlipidemia, and Unspecified essential hypertension.  Surgical History:  She  has no past surgical history on file.   Family History:  Her family history includes Diabetes in her father; Hypertension in her brother and mother; Stroke in her mother.  Social History:  She  reports that she has never smoked. She has never used smokeless tobacco. She reports that she does not drink alcohol and does not use drugs.    Tobacco:  She has never smoked tobacco.    CAGE Alcohol Screen:   CAGE screening score of 0 on 1/24/2024, showing low risk of alcohol abuse.      Patient Care Team:  Nava Guillory MD as PCP - General (Family Medicine)  Rosalia Chambers PT as Physical Therapist    Review of Systems   Constitutional:  Negative for appetite change, fatigue, fever and unexpected weight change.   HENT:  Negative for congestion, ear pain, hearing loss and sore throat.    Eyes:  Negative for discharge, redness and visual disturbance.   Respiratory:  Negative for cough, chest tightness and shortness of breath.    Cardiovascular:  Negative for chest pain and palpitations.   Gastrointestinal:  Negative for abdominal pain, blood in stool, constipation and nausea.   Endocrine: Negative for cold intolerance, heat intolerance and polyuria.   Genitourinary:  Negative for difficulty urinating, dysuria, frequency and urgency.   Musculoskeletal:  Negative for arthralgias, gait problem, joint swelling and myalgias.   Skin:  Negative for rash.   Allergic/Immunologic: Negative for food allergies.   Neurological:  Negative for dizziness, weakness, numbness and headaches.   Hematological:  Negative for adenopathy. Does not bruise/bleed easily.   Psychiatric/Behavioral:  Positive for sleep disturbance.  Negative for dysphoric mood. The patient is not nervous/anxious.          Objective:   Physical Exam  Constitutional:       General: She is not in acute distress.     Appearance: Normal appearance. She is well-developed and normal weight.   HENT:      Head: Normocephalic and atraumatic.      Right Ear: Tympanic membrane, ear canal and external ear normal.      Left Ear: Tympanic membrane, ear canal and external ear normal.      Nose: Nose normal.      Mouth/Throat:      Mouth: Mucous membranes are moist.      Pharynx: Oropharynx is clear.   Eyes:      Extraocular Movements: Extraocular movements intact.      Conjunctiva/sclera: Conjunctivae normal.      Pupils: Pupils are equal, round, and reactive to light.   Neck:      Thyroid: No thyromegaly.   Cardiovascular:      Rate and Rhythm: Normal rate and regular rhythm.      Heart sounds: Normal heart sounds. No murmur heard.  Pulmonary:      Effort: Pulmonary effort is normal. No respiratory distress.      Breath sounds: Normal breath sounds.   Chest:      Chest wall: No tenderness.   Abdominal:      General: Bowel sounds are normal. There is no distension.      Palpations: Abdomen is soft. There is no hepatomegaly, splenomegaly or mass.      Tenderness: There is no abdominal tenderness.      Hernia: No hernia is present.   Musculoskeletal:         General: Normal range of motion.      Cervical back: Normal range of motion and neck supple.      Right lower leg: No edema.      Left lower leg: No edema.   Lymphadenopathy:      Cervical: No cervical adenopathy.   Skin:     General: Skin is warm.      Findings: No rash.   Neurological:      General: No focal deficit present.      Mental Status: She is alert and oriented to person, place, and time.      Deep Tendon Reflexes: Reflexes are normal and symmetric.   Psychiatric:         Mood and Affect: Mood normal.         Behavior: Behavior normal.         Thought Content: Thought content normal.         Judgment: Judgment  normal.         /82   Pulse 66   Temp 98 °F (36.7 °C) (Temporal)   Resp 18   Ht 4' 10.27\" (1.48 m)   Wt 124 lb (56.2 kg)   SpO2 98%   BMI 25.68 kg/m²  Estimated body mass index is 25.68 kg/m² as calculated from the following:    Height as of this encounter: 4' 10.27\" (1.48 m).    Weight as of this encounter: 124 lb (56.2 kg).    Medicare Hearing Assessment:   Hearing Screening    Screening Method: Whisper Test  Whisper Test Result: Pass               Visual Acuity:   Right Eye Visual Acuity: Corrected Right Eye Chart Acuity: 20/50   Left Eye Visual Acuity: Corrected Left Eye Chart Acuity: 20/50   Both Eyes Visual Acuity: Corrected Both Eyes Chart Acuity: 20/50   Able To Tolerate Visual Acuity: Yes        Assessment & Plan:   Gloria Paz is a 69 year old female who presents for a Medicare Assessment.   Gloria was seen today for physical and sleep problem.    Diagnoses and all orders for this visit:    Encounter for annual health examination    Visit for screening mammogram  -     3D Mammogram Digital Screen, Bilateral (CPT=77067/25722); Future    Postmenopausal  -     Dexa Scan/Bone Density screening (Screening allowed every 2 years); Future    Essential hypertension controlled  -     Expanded, Low Complexity (93034)  -     amLODIPine 5 MG Oral Tab; Take 1 tablet (5 mg total) by mouth daily.  -     losartan-hydroCHLOROthiazide 50-12.5 MG Oral Tab; Take 1 tablet by mouth daily.  -     metoprolol tartrate 25 MG Oral Tab; Take 1 tablet (25 mg total) by mouth daily.  -     Comp Metabolic Panel (14); Future  -     Comp Metabolic Panel (14)  -     Ophthalmology Referral - In Network  -     continue the same dose of medication  -     limit salt in diet to less than 1000 mg daily  -    Goal bp 130/80    Palpitations controlled  -     Expanded, Low Complexity (28004)  -     metoprolol tartrate 25 MG Oral Tab; Take 1 tablet (25 mg total) by mouth daily.  -    continue the same dose of medication  -  f/u  with cardology    Pure hypercholesterolemia controlled  -     Expanded, Low Complexity (71745)  -     rosuvastatin 10 MG Oral Tab; Take 1 tablet (10 mg total) by mouth nightly.  -     Lipid Panel; Future  -     Lipid Panel  -     continue the same dose of medication    Stage 3a chronic kidney disease (HCC) stabe  -     Expanded, Low Complexity (09495)  -     cpm    Other insomnia     - reassured patient    Neck pain  -     Physical Therapy Referral - [facility]    Pain of left shoulder region  -     Physical Therapy Referral - [facility]    Prediabetes  -     Hemoglobin A1C; Future  -     Hemoglobin A1C  -     Ophthalmology Referral - In Network    Myalgia     - continue otc vitamin d    Seasonal allergic rhinitis due to pollen controlled     - cpm      The patient indicates understanding of these issues and agrees to the plan.  Reinforced healthy diet, lifestyle, and exercise.      Return in 6 months (on 7/24/2024).     Nava Guillory MD, 1/24/2024     Supplementary Documentation:   General Health:  In the past six months, have you lost more than 10 pounds without trying?: 2 - No  Has your appetite been poor?: No  Type of Diet: Balanced  How does the patient maintain a good energy level?: Appropriate Exercise  How would you describe your daily physical activity?: Moderate  How would you describe your current health state?: Good  How do you maintain positive mental well-being?: Social Interaction  On a scale of 0 to 10, with 0 being no pain and 10 being severe pain, what is your pain level?: 2 - (Mild)  At any time do you feel concerned for the safety/well-being of yourself and/or your children, in your home or elsewhere?: No  Have you had any immunizations at another office such as Influenza, Hepatitis B, Tetanus, or Pneumococcal?: No         Gloria Paz's SCREENING SCHEDULE   Tests on this list are recommended by your physician but may not be covered, or covered at this frequency, by your  insurer.   Please check with your insurance carrier before scheduling to verify coverage.   PREVENTATIVE SERVICES FREQUENCY &  COVERAGE DETAILS LAST COMPLETION DATE   Diabetes Screening    Fasting Blood Sugar /  Glucose    One screening every 12 months if never tested or if previously tested but not diagnosed with pre-diabetes   One screening every 6 months if diagnosed with pre-diabetes Lab Results   Component Value Date    GLU 97 01/17/2024        Cardiovascular Disease Screening    Lipid Panel  Cholesterol  Lipoprotein (HDL)  Triglycerides Covered every 5 years for all Medicare beneficiaries without apparent signs or symptoms of cardiovascular disease Lab Results   Component Value Date    CHOLEST 176 01/17/2024    HDL 61 01/17/2024    LDL 91 01/17/2024    TRIG 137 01/17/2024         Electrocardiogram (EKG)   Covered if needed at [facility] to Medicare, and non-screening if indicated for medical reasons 02/24/2020      Ultrasound Screening for Abdominal Aortic Aneurysm (AAA) Covered once in a lifetime for one of the following risk factors    Men who are 65-75 years old and have ever smoked    Anyone with a family history -     Colorectal Cancer Screening  Covered for ages 50-85; only need ONE of the following:    Colonoscopy   Covered every 10 years    Covered every 2 years if patient is at high risk or previous colonoscopy was abnormal -    Health Maintenance   Topic Date Due    Colorectal Cancer Screening  05/05/2024       Flexible Sigmoidoscopy   Covered every 4 years -    Fecal Occult Blood Test Covered annually 05/05/2023   Bone Density Screening    Bone density screening    Covered every 2 years after age 65 if diagnosed with risk of osteoporosis or estrogen deficiency.    Covered yearly for long-term glucocorticoid medication use (Steroids) Last Dexa Scan:    XR DEXA BONE DENSITOMETRY (CPT=77080) 02/02/2022      No recommendations at this time   Pap and Pelvic    Pap   Covered every 2 years for women at normal  risk; Annually if at high risk -  No recommendations at this time    Chlamydia Annually if high risk 05/05/2023  No recommendations at this time   Screening Mammogram    Mammogram     Recommend annually for all female patients aged 40 and older    One baseline mammogram covered for patients aged 35-39 02/02/2022    Health Maintenance   Topic Date Due    Mammogram  02/02/2023       Immunizations    Influenza Covered once per flu season  Please get every year 09/20/2023  No recommendations at this time    Pneumococcal Each vaccine (Khvodkq94 & [id number]) covered once after 65 Prevnar 13: 07/02/2019    [id number]: -     No recommendations at this time    Hepatitis B One screening covered for patients with certain risk factors   -  No recommendations at this time    Tetanus Toxoid Not covered by Medicare Part B unless medically necessary (cut with metal); may be covered with your pharmacy prescription benefits -    Tetanus, Diptheria and Pertusis TD and TDaP Not covered by Medicare Part B -  No recommendations at this time    Zoster Not covered by Medicare Part B; may be covered with your pharmacy  prescription benefits -  Zoster Vaccines(1 of 2) Never done     Annual Monitoring of Persistent Medications (ACE/ARB, digoxin diuretics, anticonvulsants)    Potassium Annually Lab Results   Component Value Date    K 4.6 01/17/2024         Creatinine   Annually Lab Results   Component Value Date    CREATSERUM 1.00 01/17/2024         BUN Annually Lab Results   Component Value Date    BUN 13 01/17/2024       Drug Serum Conc Annually No results found for: \"DIGOXIN\", \"DIG\", \"VALP\"

## 2024-02-05 ENCOUNTER — TELEPHONE (OUTPATIENT)
Dept: FAMILY MEDICINE CLINIC | Facility: CLINIC | Age: 69
End: 2024-02-05

## 2024-02-05 NOTE — TELEPHONE ENCOUNTER
RN to pt call, advised pt with upcoming spring Zyrtec will help her allergic rhinitis while pollen counts are elevated.  Advised pt to take OTC vitamin D until her follow-up appt with MD and changes to POC can be discussed at that time.  Pt verbalized understanding.  Closing encounter.

## 2024-02-05 NOTE — TELEPHONE ENCOUNTER
Spoke to pt who's requesting to speak to a nurse.  Pt was seen DOS 1/24/24 and was prescribed Zyrtec and Vitamin D.  Pt would like to know how long should she take these medications.   Updated referral faxed to [facility] at [phone].   Twin liveborn born in hospital by  section

## 2024-02-14 ENCOUNTER — HOSPITAL ENCOUNTER (OUTPATIENT)
Dept: BONE DENSITY | Age: 69
Discharge: HOME | End: 2024-02-14
Attending: FAMILY MEDICINE
Payer: MEDICARE

## 2024-02-14 ENCOUNTER — HOSPITAL ENCOUNTER (OUTPATIENT)
Dept: MAMMOGRAPHY | Age: 69
Discharge: HOME | End: 2024-02-14
Attending: FAMILY MEDICINE
Payer: MEDICARE

## 2024-02-14 ENCOUNTER — TELEPHONE (OUTPATIENT)
Dept: FAMILY MEDICINE CLINIC | Facility: CLINIC | Age: 69
End: 2024-02-14

## 2024-02-14 DIAGNOSIS — Z78.0 POSTMENOPAUSAL: ICD-10-CM

## 2024-02-14 DIAGNOSIS — Z12.31 VISIT FOR SCREENING MAMMOGRAM: ICD-10-CM

## 2024-02-14 PROCEDURE — 77067 SCR MAMMO BI INCL CAD: CPT | Performed by: FAMILY MEDICINE

## 2024-02-14 PROCEDURE — 77063 BREAST TOMOSYNTHESIS BI: CPT | Performed by: FAMILY MEDICINE

## 2024-02-14 PROCEDURE — 77080 DXA BONE DENSITY AXIAL: CPT | Performed by: FAMILY MEDICINE

## 2024-02-14 NOTE — TELEPHONE ENCOUNTER
Called patient and explained test results and instructions per Dr. Guillory's note.  Answered all questions.

## 2024-02-14 NOTE — TELEPHONE ENCOUNTER
Pt called and wants to check on her results from her mamogram and Dexa as she does not know how to use My Chart. Wants to see if everything is ok

## 2024-02-14 NOTE — TELEPHONE ENCOUNTER
Mammogram is normal, repeat in 1 year, bone density os consistent with osteopenia, recommend calcium 600 mg 2 times a day, vitamin D 2000IU daily and resistance exercises, repeat DEXA in 2 years.

## 2024-02-21 ENCOUNTER — APPOINTMENT (OUTPATIENT)
Dept: PHYSICAL THERAPY | Facility: HOSPITAL | Age: 69
End: 2024-02-21
Attending: FAMILY MEDICINE
Payer: MEDICARE

## 2024-02-28 ENCOUNTER — APPOINTMENT (OUTPATIENT)
Dept: PHYSICAL THERAPY | Facility: HOSPITAL | Age: 69
End: 2024-02-28
Attending: FAMILY MEDICINE
Payer: MEDICARE

## 2024-03-13 ENCOUNTER — APPOINTMENT (OUTPATIENT)
Dept: PHYSICAL THERAPY | Facility: HOSPITAL | Age: 69
End: 2024-03-13
Attending: FAMILY MEDICINE
Payer: MEDICARE

## 2024-03-20 ENCOUNTER — APPOINTMENT (OUTPATIENT)
Dept: PHYSICAL THERAPY | Facility: HOSPITAL | Age: 69
End: 2024-03-20
Attending: FAMILY MEDICINE
Payer: MEDICARE

## 2024-03-22 ENCOUNTER — TELEPHONE (OUTPATIENT)
Dept: FAMILY MEDICINE CLINIC | Facility: CLINIC | Age: 69
End: 2024-03-22

## 2024-03-22 NOTE — TELEPHONE ENCOUNTER
Pt called stating she is traveling to [place] end of April.    She said in the past Dr CONTRERAS prescribed her something to prevent vomiting & diarrhea while there.  Asking for it again.

## 2024-03-23 DIAGNOSIS — I10 ESSENTIAL HYPERTENSION: ICD-10-CM

## 2024-03-23 DIAGNOSIS — E78.00 PURE HYPERCHOLESTEROLEMIA: ICD-10-CM

## 2024-03-23 DIAGNOSIS — R00.2 PALPITATIONS: ICD-10-CM

## 2024-03-25 RX ORDER — LOSARTAN POTASSIUM AND HYDROCHLOROTHIAZIDE 12.5; 5 MG/1; MG/1
1 TABLET ORAL DAILY
Qty: 90 TABLET | Refills: 3 | OUTPATIENT
Start: 2024-03-25

## 2024-03-25 RX ORDER — ROSUVASTATIN CALCIUM 10 MG/1
10 TABLET, COATED ORAL NIGHTLY
Qty: 90 TABLET | Refills: 3 | OUTPATIENT
Start: 2024-03-25

## 2024-03-25 NOTE — TELEPHONE ENCOUNTER
Called patient and scheduled appt to discuss travel medications.    Future Appointments   Date Time Provider Department Center   3/27/2024  3:40 PM Nava Guillory MD [facility]

## 2024-03-27 ENCOUNTER — APPOINTMENT (OUTPATIENT)
Dept: PHYSICAL THERAPY | Facility: HOSPITAL | Age: 69
End: 2024-03-27
Attending: FAMILY MEDICINE
Payer: MEDICARE

## 2024-03-27 ENCOUNTER — OFFICE VISIT (OUTPATIENT)
Dept: FAMILY MEDICINE CLINIC | Facility: CLINIC | Age: 69
End: 2024-03-27
Payer: MEDICARE

## 2024-03-27 VITALS
HEART RATE: 99 BPM | RESPIRATION RATE: 18 BRPM | SYSTOLIC BLOOD PRESSURE: 130 MMHG | DIASTOLIC BLOOD PRESSURE: 70 MMHG | OXYGEN SATURATION: 98 % | BODY MASS INDEX: 25.89 KG/M2 | WEIGHT: 125 LBS | HEIGHT: 58.27 IN | TEMPERATURE: 97 F

## 2024-03-27 DIAGNOSIS — Z71.84 TRAVEL ADVICE ENCOUNTER: ICD-10-CM

## 2024-03-27 DIAGNOSIS — E78.00 PURE HYPERCHOLESTEROLEMIA: ICD-10-CM

## 2024-03-27 DIAGNOSIS — R00.2 PALPITATIONS: ICD-10-CM

## 2024-03-27 DIAGNOSIS — R09.82 POST-NASAL DRIP: ICD-10-CM

## 2024-03-27 DIAGNOSIS — I10 ESSENTIAL HYPERTENSION: Primary | ICD-10-CM

## 2024-03-27 DIAGNOSIS — J30.1 ALLERGIC RHINITIS DUE TO POLLEN, UNSPECIFIED SEASONALITY: ICD-10-CM

## 2024-03-27 PROCEDURE — 3008F BODY MASS INDEX DOCD: CPT | Performed by: FAMILY MEDICINE

## 2024-03-27 PROCEDURE — 1159F MED LIST DOCD IN RCRD: CPT | Performed by: FAMILY MEDICINE

## 2024-03-27 PROCEDURE — 3078F DIAST BP <80 MM HG: CPT | Performed by: FAMILY MEDICINE

## 2024-03-27 PROCEDURE — 99214 OFFICE O/P EST MOD 30 MIN: CPT | Performed by: FAMILY MEDICINE

## 2024-03-27 PROCEDURE — 3075F SYST BP GE 130 - 139MM HG: CPT | Performed by: FAMILY MEDICINE

## 2024-03-27 RX ORDER — AMLODIPINE BESYLATE 5 MG/1
5 TABLET ORAL DAILY
Qty: 90 TABLET | Refills: 1 | Status: SHIPPED | OUTPATIENT
Start: 2024-03-27

## 2024-03-27 RX ORDER — LOSARTAN POTASSIUM AND HYDROCHLOROTHIAZIDE 12.5; 5 MG/1; MG/1
1 TABLET ORAL DAILY
Qty: 90 TABLET | Refills: 1 | Status: SHIPPED | OUTPATIENT
Start: 2024-03-27

## 2024-03-27 RX ORDER — AZITHROMYCIN 500 MG/1
500 TABLET, FILM COATED ORAL DAILY
Qty: 5 TABLET | Refills: 0 | Status: SHIPPED | OUTPATIENT
Start: 2024-03-27

## 2024-03-27 RX ORDER — CETIRIZINE HYDROCHLORIDE 10 MG/1
10 TABLET ORAL DAILY
Qty: 90 TABLET | Refills: 1 | Status: SHIPPED | OUTPATIENT
Start: 2024-03-27

## 2024-03-27 RX ORDER — ROSUVASTATIN CALCIUM 10 MG/1
10 TABLET, COATED ORAL NIGHTLY
Qty: 90 TABLET | Refills: 1 | Status: SHIPPED | OUTPATIENT
Start: 2024-03-27

## 2024-03-27 RX ORDER — FLUTICASONE PROPIONATE 50 MCG
2 SPRAY, SUSPENSION (ML) NASAL DAILY
Qty: 48 G | Refills: 1 | Status: SHIPPED | OUTPATIENT
Start: 2024-03-27 | End: 2024-06-25

## 2024-03-28 NOTE — PROGRESS NOTES
Gloria Paz is a 69 year old female.  Chief Complaint   Patient presents with    Medication Request     Medication request for [place]       HPI:   Gloria Paz is a 69 year old female with history of hypertension and hyperlipidemia seen for follow up and medication refill.    HTN: complaint with medication and low salt diet, states blood pressure is well controlled. Denies GIBSON, blurred vision, dizziness, palpitations or swelling in the legs.    Hyperlipidemia: compliant with medication,  tolerating medication well without any side effects, needs a refill.    Prediabetes: states tries to limit carbohydrates, is vegetarian.    Nasal congestion and cough for the past 2 days, patient states stopped taking her zyrtec as she was feeling well and symptoms started, has jordan taking it again since yesterday.    Travelling to [place] and needs medication for traveller's diarrhea    ALLERGY:     Allergies   Allergen Reactions    Atorvastatin MYALGIA       MEDICATIONS:       metoprolol Tartrate 25 MG Oral Tab Take 1 tablet (25 mg total) by mouth once daily. Disp: 90 tablet Rfl: 1   Losartan Potassium-HCTZ 100-12.5 MG Oral Tab Take 1 tablet by mouth once daily. Disp: 90 tablet Rfl: 1   Rosuvastatin Calcium 10 MG Oral Tab Take 1 tablet (10 mg total) by mouth nightly. Disp: 90 tablet Rfl: 1   aspirin (RAISA ASPIRIN EC LOW DOSE) 81 MG Oral Tab EC Take 81 mg by mouth daily. Disp:  Rfl:       Past Medical History:   Diagnosis Date    Eczema     Other and unspecified hyperlipidemia     Unspecified essential hypertension       Social History:  Social History     Socioeconomic History    Marital status:      Spouse name: Naman    Number of children: 2   Occupational History    Occupation: floor sales     Employer: [employer]   Tobacco Use    Smoking status: Never    Smokeless tobacco: Never   Vaping Use    Vaping Use: Never used   Substance and Sexual Activity    Alcohol use: No     Alcohol/week: 0.0 standard drinks of  alcohol    Drug use: No   Other Topics Concern    Caffeine Concern Yes     Comment: tea    Exercise Yes   Social History Narrative    vegetarian        REVIEW OF SYSTEMS:   GENERAL HEALTH: feels well otherwise  SKIN: denies any unusual skin lesions or rashes  RESPIRATORY: denies shortness of breath with exertion  CARDIOVASCULAR: denies chest pain on exertion  GI: denies abdominal pain and denies heartburn  NEURO: denies headaches  PSYCH: feels stressed and anxious    EXAM:   /70   Pulse 99   Temp 97.3 °F (36.3 °C) (Temporal)   Resp 18   Ht 4' 10.27\" (1.48 m)   Wt 125 lb (56.7 kg)   SpO2 98%   BMI 25.88 kg/m²   GENERAL: well developed, well nourished,in no apparent distress  SKIN: no rashes,no suspicious lesions  HEENT: atraumatic, normocephalic,ears and throat are clear  NECK: supple,no adenopathy,no bruits  LUNGS: clear to auscultation, no wheezing, rhonchi or rales  CARDIO: RRR without murmur  GI: good BS's,no masses, HSM or tenderness  EXTREMITIES: no cyanosis, clubbing or edema    ASSESSMENT AND PLAN:   Gloria was seen today for medication request.    Diagnoses and all orders for this visit:    Essential hypertension controlled  -     amLODIPine 5 MG Oral Tab; Take 1 tablet (5 mg total) by mouth daily.  -     losartan-hydroCHLOROthiazide 50-12.5 MG Oral Tab; Take 1 tablet by mouth daily.  -     metoprolol tartrate 25 MG Oral Tab; Take 1 tablet (25 mg total) by mouth daily.  -    continue the same dose of medication         -    limit salt to less than 1000 mg a day         -    Goal BP is less than 130/80    Post-nasal drip  -     cetirizine (ZYRTEC ALLERGY) 10 MG Oral Tab; Take 1 tablet (10 mg total) by mouth daily.  -    continue the same dose of medicaiton    Palpitations stable  -     metoprolol tartrate 25 MG Oral Tab; Take 1 tablet (25 mg total) by mouth daily.  -    continue the same dose of medication    Pure hypercholesterolemia controlled  -     rosuvastatin 10 MG Oral Tab; Take 1 tablet  (10 mg total) by mouth nightly.  -     continue the same dose of medication  -     continue to limit saturated fats and cholesterol in diet    Travel advice encounter  -     azithromycin 500 MG Oral Tab; Take 1 tablet (500 mg total) by mouth daily.    Allergic rhinitis due to pollen, unspecified seasonality  -     fluticasone propionate 50 MCG/ACT Nasal Suspension; 2 sprays by Each Nare route daily.    Return in about 6 months (around 9/27/2024) for hyperlipidemia, HTN f/u.       The 21st Century Cures Act makes medical notes like these available to patients in the interest of transparency. Please be advised this is a medical document. Medical documents are intended to carry relevant information, facts as evident, and the clinical opinion of the practitioner. The medical note is intended as peer to peer communication and may appear blunt or direct. It is written in medical language and may contain abbreviations or verbiage that are unfamiliar.

## 2024-04-03 ENCOUNTER — APPOINTMENT (OUTPATIENT)
Dept: PHYSICAL THERAPY | Facility: HOSPITAL | Age: 69
End: 2024-04-03
Attending: FAMILY MEDICINE
Payer: MEDICARE

## 2024-04-29 ENCOUNTER — TELEPHONE (OUTPATIENT)
Dept: FAMILY MEDICINE CLINIC | Facility: CLINIC | Age: 69
End: 2024-04-29

## 2024-04-29 DIAGNOSIS — Z71.84 TRAVEL ADVICE ENCOUNTER: Primary | ICD-10-CM

## 2024-04-29 RX ORDER — AZITHROMYCIN 500 MG/1
500 TABLET, FILM COATED ORAL DAILY
Qty: 5 TABLET | Refills: 0 | Status: SHIPPED | OUTPATIENT
Start: 2024-04-29

## 2024-04-29 NOTE — TELEPHONE ENCOUNTER
Pt called and is requesting antibiotic for her travel to [place]. LOV 3/27/24 was for Rx for travel. Pls advise and call

## 2024-04-29 NOTE — TELEPHONE ENCOUNTER
Please advise if OK to prescribe.    LOV 3/27/24: \"Travelling to [place] and needs medication for traveller's diarrhea \"

## 2024-06-22 ENCOUNTER — PATIENT MESSAGE (OUTPATIENT)
Dept: FAMILY MEDICINE CLINIC | Facility: CLINIC | Age: 69
End: 2024-06-22

## 2024-07-17 DIAGNOSIS — E78.00 PURE HYPERCHOLESTEROLEMIA: ICD-10-CM

## 2024-07-17 DIAGNOSIS — R00.2 PALPITATIONS: ICD-10-CM

## 2024-07-17 DIAGNOSIS — I10 ESSENTIAL HYPERTENSION: ICD-10-CM

## 2024-07-17 LAB — AMB EXT OCCULT BLOOD RESULT: NEGATIVE

## 2024-07-21 RX ORDER — AMLODIPINE BESYLATE 5 MG/1
5 TABLET ORAL DAILY
Qty: 90 TABLET | Refills: 3 | Status: SHIPPED | OUTPATIENT
Start: 2024-07-21

## 2024-07-21 NOTE — TELEPHONE ENCOUNTER
Refill passed per [facility] protocol.  Unable to send rosuvastatin due to high allergy warning.    Requested Prescriptions   Pending Prescriptions Disp Refills    METOPROLOL TARTRATE 25 MG Oral Tab [Pharmacy Med Name: METOPROLOL TARTRATE 25 MG Tablet] 90 tablet 3     Sig: TAKE 1 TABLET EVERY DAY       Hypertension Medications Protocol Passed - 7/17/2024  3:15 PM        Passed - CMP or BMP in past 12 months        Passed - Last BP reading less than 140/90     BP Readings from Last 1 Encounters:   03/27/24 130/70               Passed - In person appointment or virtual visit in the past 12 mos or appointment in next 3 mos     Recent Outpatient Visits              3 months ago Essential hypertension    [address] Nava Guillory MD    Office Visit    5 months ago Encounter for annual health examination    [address] Nava Lawton MD    Office Visit    10 months ago Primary osteoarthritis of left knee    [address] [place] Nava Guillory MD    Office Visit    12 months ago Encounter for annual health examination    [address] Nava Lawton MD    Office Visit    1 year ago     [facility] Rosalia Chambers PT    Office Visit          Future Appointments         Provider Department Appt Notes    In 4 weeks Nava Guillory MD [facility], [address] [place] [address] 6 month follow-up                    Passed - EGFRCR or GFRNAA > 50     GFR Evaluation  EGFRCR: 61 , resulted on 1/17/2024            AMLODIPINE 5 MG Oral Tab [Pharmacy Med Name: AMLODIPINE BESYLATE 5 MG Tablet] 90 tablet 3     Sig: TAKE 1 TABLET EVERY DAY       Hypertension Medications Protocol Passed - 7/17/2024  3:15 PM        Passed - CMP or BMP in past 12 months        Passed - Last BP reading less than 140/90     BP Readings from  Last 1 Encounters:   03/27/24 130/70               Passed - In person appointment or virtual visit in the past 12 mos or appointment in next 3 mos     Recent Outpatient Visits              3 months ago Essential hypertension    [facility], [address] [place] [address] Nava Lawton MD    Office Visit    5 months ago Encounter for annual health examination    [address] Nava Lawton MD    Office Visit    10 months ago Primary osteoarthritis of left knee    [address] Nava Lawton MD    Office Visit    12 months ago Encounter for annual health examination    [address] Nava Lawton MD    Office Visit    1 year ago     [facility] Rosalia Chambers PT    Office Visit          Future Appointments         Provider Department Appt Notes    In 4 weeks Nava Guillory MD [address] 6 month follow-up                    Passed - EGFRCR or GFRNAA > 50     GFR Evaluation  EGFRCR: 61 , resulted on 1/17/2024            ROSUVASTATIN 10 MG Oral Tab [Pharmacy Med Name: ROSUVASTATIN CALCIUM 10 MG Tablet] 90 tablet 3     Sig: TAKE 1 TABLET EVERY NIGHT       Cholesterol Medication Protocol Passed - 7/17/2024  3:15 PM        Passed - ALT < 80     Lab Results   Component Value Date    ALT 19 01/17/2024             Passed - ALT resulted within past year        Passed - Lipid panel within past 12 months     Lab Results   Component Value Date    CHOLEST 176 01/17/2024    TRIG 137 01/17/2024    HDL 61 01/17/2024    LDL 91 01/17/2024    VLDL 24 10/22/2015    TCHDLRATIO 2.9 01/17/2024    NONHDLC 115 01/17/2024             Passed - In person appointment or virtual visit in the past 12 mos or appointment in next 3 mos     Recent Outpatient Visits              3 months ago Essential hypertension    [facility], [address] [place] [address], Nava Lawton MD    Office Visit    5 months ago Encounter for annual health examination    [facility], [address] [place] [address], Nava Lawton MD    Office Visit    10 months ago Primary osteoarthritis of left knee    [facility], [address] [place] [address], Nava Lawton MD    Office Visit    12 months ago Encounter for annual health examination    [facility], [address] [place] [address], Nava Lawton MD    Office Visit    1 year ago     [facility] Rosalia Chambers, PT    Office Visit          Future Appointments         Provider Department Appt Notes    In 4 weeks Nava Guillory MD [facility], [address] [place] [address] 6 month follow-up                         Recent Outpatient Visits              3 months ago Essential hypertension    [facility], [address] [place] [address], Nava Lawton MD    Office Visit    5 months ago Encounter for annual health examination    [facility], [address] [place] [address], Nava Lawton MD    Office Visit    10 months ago Primary osteoarthritis of left knee    [facility], [address] [place] [address], Nava Lawton MD    Office Visit    12 months ago Encounter for annual health examination    [facility], [address] [place] [address], Nava Lawton MD    Office Visit    1 year ago     [facility] Rosalia Chambers, PT    Office Visit            Future Appointments         Provider Department Appt Notes    In 4 weeks Nava Guillory MD [facility], [address] [place] [address] 6 month follow-up

## 2024-07-22 RX ORDER — ROSUVASTATIN CALCIUM 10 MG/1
10 TABLET, COATED ORAL NIGHTLY
Qty: 90 TABLET | Refills: 0 | Status: SHIPPED | OUTPATIENT
Start: 2024-07-22

## 2024-07-31 ENCOUNTER — TELEPHONE (OUTPATIENT)
Dept: FAMILY MEDICINE CLINIC | Facility: CLINIC | Age: 69
End: 2024-07-31

## 2024-07-31 DIAGNOSIS — E78.00 PURE HYPERCHOLESTEROLEMIA: ICD-10-CM

## 2024-07-31 DIAGNOSIS — R73.03 PREDIABETES: Primary | ICD-10-CM

## 2024-07-31 DIAGNOSIS — I10 ESSENTIAL HYPERTENSION: ICD-10-CM

## 2024-08-10 ENCOUNTER — MED REC SCAN ONLY (OUTPATIENT)
Dept: FAMILY MEDICINE CLINIC | Facility: CLINIC | Age: 69
End: 2024-08-10

## 2024-08-15 LAB
ALBUMIN/GLOBULIN RATIO: 1.5 (CALC) (ref 1–2.5)
ALBUMIN: 4.1 G/DL (ref 3.6–5.1)
ALKALINE PHOSPHATASE: 68 U/L (ref 37–153)
ALT: 23 U/L (ref 6–29)
AST: 20 U/L (ref 10–35)
BILIRUBIN, TOTAL: 0.4 MG/DL (ref 0.2–1.2)
BUN: 11 MG/DL (ref 7–25)
CALCIUM: 9.6 MG/DL (ref 8.6–10.4)
CARBON DIOXIDE: 25 MMOL/L (ref 20–32)
CHLORIDE: 100 MMOL/L (ref 98–110)
CHOL/HDLC RATIO: 2.9 (CALC)
CHOLESTEROL, TOTAL: 188 MG/DL
CREATININE: 0.91 MG/DL (ref 0.6–1)
EGFR: 68 ML/MIN/1.73M2
GLOBULIN: 2.8 G/DL (CALC) (ref 1.9–3.7)
GLUCOSE: 100 MG/DL (ref 65–99)
HDL CHOLESTEROL: 65 MG/DL
HEMOGLOBIN A1C: 6.5 % OF TOTAL HGB
LDL-CHOLESTEROL: 97 MG/DL (CALC)
NON-HDL CHOLESTEROL: 123 MG/DL (CALC)
POTASSIUM: 4.4 MMOL/L (ref 3.5–5.3)
PROTEIN, TOTAL: 6.9 G/DL (ref 6.1–8.1)
SODIUM: 135 MMOL/L (ref 135–146)
TRIGLYCERIDES: 163 MG/DL

## 2024-08-19 ENCOUNTER — OFFICE VISIT (OUTPATIENT)
Dept: FAMILY MEDICINE CLINIC | Facility: CLINIC | Age: 69
End: 2024-08-19
Payer: MEDICARE

## 2024-08-19 VITALS
DIASTOLIC BLOOD PRESSURE: 72 MMHG | OXYGEN SATURATION: 98 % | HEART RATE: 68 BPM | BODY MASS INDEX: 26.26 KG/M2 | WEIGHT: 126.81 LBS | RESPIRATION RATE: 18 BRPM | HEIGHT: 58.27 IN | TEMPERATURE: 97 F | SYSTOLIC BLOOD PRESSURE: 124 MMHG

## 2024-08-19 DIAGNOSIS — I10 ESSENTIAL HYPERTENSION: Primary | ICD-10-CM

## 2024-08-19 DIAGNOSIS — E55.9 VITAMIN D DEFICIENCY: ICD-10-CM

## 2024-08-19 DIAGNOSIS — R73.03 PREDIABETES: ICD-10-CM

## 2024-08-19 DIAGNOSIS — N18.31 STAGE 3A CHRONIC KIDNEY DISEASE (HCC): Chronic | ICD-10-CM

## 2024-08-19 DIAGNOSIS — Z13.0 SCREENING FOR DEFICIENCY ANEMIA: ICD-10-CM

## 2024-08-19 DIAGNOSIS — E11.9 NEW ONSET TYPE 2 DIABETES MELLITUS (HCC): ICD-10-CM

## 2024-08-19 DIAGNOSIS — Z13.29 SCREENING FOR THYROID DISORDER: ICD-10-CM

## 2024-08-19 DIAGNOSIS — R00.2 PALPITATIONS: ICD-10-CM

## 2024-08-19 DIAGNOSIS — E78.00 PURE HYPERCHOLESTEROLEMIA: ICD-10-CM

## 2024-08-19 PROBLEM — E11.65 TYPE 2 DIABETES MELLITUS WITH HYPERGLYCEMIA, WITHOUT LONG-TERM CURRENT USE OF INSULIN (HCC): Chronic | Status: ACTIVE | Noted: 2024-08-19

## 2024-08-19 PROCEDURE — 3008F BODY MASS INDEX DOCD: CPT | Performed by: FAMILY MEDICINE

## 2024-08-19 PROCEDURE — 3074F SYST BP LT 130 MM HG: CPT | Performed by: FAMILY MEDICINE

## 2024-08-19 PROCEDURE — 1159F MED LIST DOCD IN RCRD: CPT | Performed by: FAMILY MEDICINE

## 2024-08-19 PROCEDURE — 3078F DIAST BP <80 MM HG: CPT | Performed by: FAMILY MEDICINE

## 2024-08-19 PROCEDURE — 99214 OFFICE O/P EST MOD 30 MIN: CPT | Performed by: FAMILY MEDICINE

## 2024-08-19 PROCEDURE — 3044F HG A1C LEVEL LT 7.0%: CPT | Performed by: FAMILY MEDICINE

## 2024-08-19 RX ORDER — LOSARTAN POTASSIUM AND HYDROCHLOROTHIAZIDE 12.5; 5 MG/1; MG/1
1 TABLET ORAL DAILY
Qty: 90 TABLET | Refills: 1 | Status: SHIPPED | OUTPATIENT
Start: 2024-08-19

## 2024-08-19 RX ORDER — ROSUVASTATIN CALCIUM 10 MG/1
10 TABLET, COATED ORAL NIGHTLY
Qty: 90 TABLET | Refills: 1 | Status: SHIPPED | OUTPATIENT
Start: 2024-08-19

## 2024-08-19 RX ORDER — AMLODIPINE BESYLATE 5 MG/1
5 TABLET ORAL DAILY
Qty: 90 TABLET | Refills: 1 | Status: SHIPPED | OUTPATIENT
Start: 2024-08-19

## 2024-08-19 NOTE — PROGRESS NOTES
Gloria Paz is a 69 year old female.  Chief Complaint   Patient presents with    Follow - Up     ASSESSMENT AND PLAN:   Gloria was seen today for follow - up.    Diagnoses and all orders for this visit:    Essential hypertension controlled  -     amLODIPine 5 MG Oral Tab; Take 1 tablet (5 mg total) by mouth daily.  -     losartan-hydroCHLOROthiazide 50-12.5 MG Oral Tab; Take 1 tablet by mouth daily.  -     metoprolol tartrate 25 MG Oral Tab; Take 1 tablet (25 mg total) by mouth daily.  -     Comp Metabolic Panel (14); Future  -     Comp Metabolic Panel (14)  -     continue the same dose of medication  -      DASH diet, limit salt to 8723-3246 mg a day  -      Goal /80    Pure hypercholesterolemia controlled  -     rosuvastatin 10 MG Oral Tab; Take 1 tablet (10 mg total) by mouth nightly.  -     Lipid Panel; Future  -     Lipid Panel  -     continue the same dose of medication  -     continue to limit saturated fas and cholesterol in diet.    Stage 3a chronic kidney disease (HCC) stable        - likely secondary to chronic hypertension        - encouraged good blood pressure control    Palpitations stable  -     metoprolol tartrate 25 MG Oral Tab; Take 1 tablet (25 mg total) by mouth daily.         -     continue the same dose of medication    New onset type 2 diabetes mellitus (HCC)  -     COMP METABOLIC PANEL [74346] [Q]; Future  -     HGB A1C [496] [Q]; Future  -     COMP METABOLIC PANEL [20594] [Q]  -     HGB A1C [496] [Q]  -     Comp Metabolic Panel (14); Future  -     HGB A1C [496] [Q]; Future  -     Comp Metabolic Panel (14)  -     HGB A1C [496] [Q]  -     discussed starting medication, patient prefers life style changes  -     advised to limit refined sugars and carbs in diet.  -      repeat labs in 6 months    Screening for thyroid disorder  -     Assay, Thyroid Stim Hormone; Future  -     Assay, Thyroid Stim Hormone    Vitamin D deficiency  -     Vitamin D; Future    Screening for deficiency  anemia  -     CBC With Differential With Platelet; Future  -     CBC With Differential With Platelet    Return in about 6 months (around 2/19/2025) for HTN f/u, Diabetes f/u, hyperlipidemia.       The 21st Century Cures Act makes medical notes like these available to patients in the interest of transparency. Please be advised this is a medical document. Medical documents are intended to carry relevant information, facts as evident, and the clinical opinion of the practitioner. The medical note is intended as peer to peer communication and may appear blunt or direct. It is written in medical language and may contain abbreviations or verbiage that are unfamiliar.         HPI:   Gloria Paz is a 69 year old female with history of hypertension and hyperlipidemia seen for follow up and medication refill.    HTN: complaint with medication and low salt diet, states blood pressure is well controlled. Denies GIBSON, blurred vision, dizziness.  has some swelling in her legs when she visited [facility] and got off the flight but it resolved. Has some palpitations for a couple of weeks in the night after she came back from [facility] but for the past 2 weeks has been feeling well and has had no symptoms.    Hyperlipidemia: compliant with medication,  tolerating medication well without any side effects, needs a refill.    Prediabetes: has not been watching her diet, states has been eating more sweets and did gain weight.    ALLERGY:     Allergies   Allergen Reactions    Atorvastatin MYALGIA       MEDICATIONS:       metoprolol Tartrate 25 MG Oral Tab Take 1 tablet (25 mg total) by mouth once daily. Disp: 90 tablet Rfl: 1   Losartan Potassium-HCTZ 100-12.5 MG Oral Tab Take 1 tablet by mouth once daily. Disp: 90 tablet Rfl: 1   Rosuvastatin Calcium 10 MG Oral Tab Take 1 tablet (10 mg total) by mouth nightly. Disp: 90 tablet Rfl: 1   aspirin (RAISA ASPIRIN EC LOW DOSE) 81 MG Oral Tab EC Take 81 mg by mouth daily. Disp:  Rfl:        Past Medical History:    Eczema    Other and unspecified hyperlipidemia    Unspecified essential hypertension      Social History:  Social History     Socioeconomic History    Marital status:      Spouse name: Naman    Number of children: 2   Occupational History    Occupation: [employer] sales     Employer: [employer]   Tobacco Use    Smoking status: Never    Smokeless tobacco: Never   Vaping Use    Vaping status: Never Used   Substance and Sexual Activity    Alcohol use: No     Alcohol/week: 0.0 standard drinks of alcohol    Drug use: No   Other Topics Concern    Caffeine Concern Yes     Comment: tea    Exercise Yes   Social History Narrative    vegetarian     Social Determinants of Health     Physical Activity: Insufficiently Active (1/8/2021)    Received from "[employer]    Exercise Vital Sign     Days of Exercise per Week: 4 days     Minutes of Exercise per Session: 30 min        REVIEW OF SYSTEMS:   A comprehensive 10 point review of systems was completed.  Pertinent positives and negatives noted in the the HPI.    EXAM:   /72   Pulse 68   Temp 97.1 °F (36.2 °C) (Temporal)   Resp 18   Ht 4' 10.27\" (1.48 m)   Wt 126 lb 12.8 oz (57.5 kg)   SpO2 98%   BMI 26.26 kg/m²   GENERAL: well developed, well nourished,in no apparent distress  SKIN: no rashes,no suspicious lesions  HEENT: atraumatic, normocephalic,ears and throat are clear  NECK: supple,no adenopathy,no bruits  LUNGS: clear to auscultation, no wheezing, rhonchi or rales  CARDIO: RRR without murmur  GI: good BS's,no masses, HSM or tenderness  EXTREMITIES: no cyanosis, clubbing or edema  Bilateral barefoot skin diabetic exam is normal, visualized feet and the appearance is normal.  Bilateral monofilament/sensation of both feet is normal.  Pulsation pedal pulse exam of both lower legs/feet is normal as well.      Component      Latest Ref Rng 8/14/2024   Glucose      65 - 99 mg/dL 100 (H)    BUN      7 - 25  mg/dL 11    CREATININE      0.60 - 1.00 mg/dL 0.91    EGFR      > OR = 60 mL/min/1.73m2 68    BUN/CREATININE RATIO      6 - 22 (calc) SEE NOTE:    Sodium      135 - 146 mmol/L 135    Potassium      3.5 - 5.3 mmol/L 4.4    Chloride      98 - 110 mmol/L 100    Carbon Dioxide, Total      20 - 32 mmol/L 25    CALCIUM      8.6 - 10.4 mg/dL 9.6    PROTEIN, TOTAL      6.1 - 8.1 g/dL 6.9    Albumin      3.6 - 5.1 g/dL 4.1    Globulin      1.9 - 3.7 g/dL (calc) 2.8    A/G Ratio      1.0 - 2.5 (calc) 1.5    Total Bilirubin      0.2 - 1.2 mg/dL 0.4    Alkaline Phosphatase      37 - 153 U/L 68    AST (SGOT)      10 - 35 U/L 20    ALT (SGPT)      6 - 29 U/L 23    Cholesterol, Total      <200 mg/dL 188    HDL Cholesterol      > OR = 50 mg/dL 65    Triglycerides      <150 mg/dL 163 (H)    LDL Cholesterol Calc      mg/dL (calc) 97    Chol/HDL Ratio      <5.0 (calc) 2.9    NON-HDL CHOLESTEROL      <130 mg/dL (calc) 123    HEMOGLOBIN A1c      <5.7 % of total Hgb 6.5 (H)

## 2024-11-02 DIAGNOSIS — R09.82 POST-NASAL DRIP: ICD-10-CM

## 2024-11-06 RX ORDER — CETIRIZINE HYDROCHLORIDE 10 MG/1
10 TABLET ORAL DAILY
Qty: 90 TABLET | Refills: 3 | Status: SHIPPED | OUTPATIENT
Start: 2024-11-06

## 2024-11-06 NOTE — TELEPHONE ENCOUNTER
Refill Per Protocol     Requested Prescriptions   Pending Prescriptions Disp Refills    CETIRIZINE 10 MG Oral Tab [Pharmacy Med Name: Cetirizine HCl Oral Tablet 10 MG] 90 tablet 3     Sig: TAKE 1 TABLET EVERY DAY (SUBSTITUTED FOR ZYRTEC)       Allergy Medication Protocol Passed - 11/6/2024 12:08 PM        Passed - In person appointment or virtual visit in the past 12 mos or appointment in next 3 mos     Recent Outpatient Visits              2 months ago Essential hypertension    [facility], [address] [place], [place] [address]Juan Daniel Madhavi, MD    Office Visit    7 months ago Essential hypertension    [facility], [address] [place], [place] [address]Juan Daniel Madhavi, MD    Office Visit    9 months ago Encounter for annual health examination    [facility], [address] [place], [place] [address]Juan Daniel Madhavi, MD    Office Visit    1 year ago Primary osteoarthritis of left knee    [facility], [address] [place], [place] [address]Juan Daniel Madhavi, MD    Office Visit    1 year ago Encounter for annual health examination    [facility], [address] [place], [place] [address]Juan Daniel Madhavi, MD    Office Visit                               Recent Outpatient Visits              2 months ago Essential hypertension    [facility], [address] [place], [place] [address]Juan Daniel Madhavi, MD    Office Visit    7 months ago Essential hypertension    [facility], [address] [place], [place] [address]Juan Daniel Madhavi, MD    Office Visit    9 months ago Encounter for annual health examination    [facility], [address] [place], [place] [address]Juan Daniel Madhavi, MD    Office Visit    1 year ago Primary osteoarthritis of left knee    [facility], [address] [place], [place] [address]Juan Daniel Madhavi, MD    Office Visit    1 year ago Encounter for annual health examination    [facility], [address] [place], [place] [address]Juan Daniel Madhavi, MD    Office Visit

## 2024-11-22 ENCOUNTER — TELEPHONE (OUTPATIENT)
Dept: FAMILY MEDICINE CLINIC | Facility: CLINIC | Age: 69
End: 2024-11-22

## 2024-11-22 DIAGNOSIS — E55.9 VITAMIN D DEFICIENCY: ICD-10-CM

## 2024-11-22 DIAGNOSIS — Z13.29 SCREENING FOR THYROID DISORDER: ICD-10-CM

## 2024-11-22 DIAGNOSIS — E11.65 TYPE 2 DIABETES MELLITUS WITH HYPERGLYCEMIA, WITHOUT LONG-TERM CURRENT USE OF INSULIN (HCC): Primary | Chronic | ICD-10-CM

## 2024-11-22 DIAGNOSIS — Z13.0 SCREENING FOR DEFICIENCY ANEMIA: ICD-10-CM

## 2024-11-22 DIAGNOSIS — E78.00 PURE HYPERCHOLESTEROLEMIA: ICD-10-CM

## 2024-11-22 LAB
ALBUMIN/GLOBULIN RATIO: 1.3 (CALC) (ref 1–2.5)
ALBUMIN: 4.1 G/DL (ref 3.6–5.1)
ALKALINE PHOSPHATASE: 70 U/L (ref 37–153)
ALT: 20 U/L (ref 6–29)
AST: 20 U/L (ref 10–35)
BILIRUBIN, TOTAL: 0.4 MG/DL (ref 0.2–1.2)
BUN/CREATININE RATIO: 13 (CALC) (ref 6–22)
BUN: 14 MG/DL (ref 7–25)
CALCIUM: 9.3 MG/DL (ref 8.6–10.4)
CARBON DIOXIDE: 27 MMOL/L (ref 20–32)
CHLORIDE: 102 MMOL/L (ref 98–110)
CHOL/HDLC RATIO: 3.3 (CALC)
CHOLESTEROL, TOTAL: 180 MG/DL
CREATININE: 1.12 MG/DL (ref 0.6–1)
EGFR: 53 ML/MIN/1.73M2
GLOBULIN: 3.1 G/DL (CALC) (ref 1.9–3.7)
GLUCOSE: 99 MG/DL (ref 65–99)
HDL CHOLESTEROL: 55 MG/DL
HEMOGLOBIN A1C: 6.5 % OF TOTAL HGB
LDL-CHOLESTEROL: 95 MG/DL (CALC)
NON-HDL CHOLESTEROL: 125 MG/DL (CALC)
POTASSIUM: 4.4 MMOL/L (ref 3.5–5.3)
PROTEIN, TOTAL: 7.2 G/DL (ref 6.1–8.1)
SODIUM: 137 MMOL/L (ref 135–146)
TRIGLYCERIDES: 206 MG/DL

## 2024-11-22 NOTE — TELEPHONE ENCOUNTER
Called and spoke to patient HgbA1c in the diabetic range still, advised starting medication, patient would like to try diet and lifestyle changes for 3 more months and then follow up.  Please schedule for February [facility].

## 2024-11-22 NOTE — TELEPHONE ENCOUNTER
Received call from pt asking about her test results, labs completed yesterday. Informed her Dr. Guillory has not put comment on results, we will contact her when Dr. Guillory has reviewed them. Patient asked about her A1c, I did inform her it was the same as previous. Diabetic diet (things to add/things to avoid) discussed with pt, also discussed exercise. Patient feels her diet is fairly good, but she will start to incorporate some of the recommendations. Informed her we will contact her with Dr. Guillory recommendations when labs reviewed. Patient stated understanding and agreed to plan.     Dr. Guillory- can you review labs when able? A1c same as before, DM diet discussed with pt.

## 2024-11-25 ENCOUNTER — TELEPHONE (OUTPATIENT)
Dept: FAMILY MEDICINE CLINIC | Facility: CLINIC | Age: 69
End: 2024-11-25

## 2024-11-25 NOTE — TELEPHONE ENCOUNTER
Unable to reach patient for medication adherence consult. LVM for patient to call me back at [phone].

## 2025-01-15 DIAGNOSIS — E78.00 PURE HYPERCHOLESTEROLEMIA: ICD-10-CM

## 2025-01-18 RX ORDER — ROSUVASTATIN CALCIUM 10 MG/1
10 TABLET, COATED ORAL NIGHTLY
Qty: 90 TABLET | Refills: 0 | Status: SHIPPED | OUTPATIENT
Start: 2025-01-18

## 2025-01-18 NOTE — TELEPHONE ENCOUNTER
Refill passed per [facility] protocol.     Passed protocol but high interaction because allergy to atorvastatin    Requested Prescriptions   Pending Prescriptions Disp Refills    ROSUVASTATIN 10 MG Oral Tab [Pharmacy Med Name: Rosuvastatin Calcium Oral Tablet 10 MG] 90 tablet 3     Sig: TAKE 1 TABLET EVERY NIGHT       Cholesterol Medication Protocol Passed - 1/18/2025  9:08 AM        Passed - ALT < 80     Lab Results   Component Value Date    ALT 20 11/21/2024             Passed - ALT resulted within past year        Passed - Lipid panel within past 12 months     Lab Results   Component Value Date    CHOLEST 180 11/21/2024    TRIG 206 (H) 11/21/2024    HDL 55 11/21/2024    LDL 95 11/21/2024    VLDL 24 10/22/2015    TCHDLRATIO 3.3 11/21/2024    NONHDLC 125 11/21/2024             Passed - In person appointment or virtual visit in the past 12 mos or appointment in next 3 mos     Recent Outpatient Visits              5 months ago Essential hypertension    [address] Nava Lawton MD    Office Visit    9 months ago Essential hypertension    [address]    Office Visit    12 months ago Encounter for annual health examination    [address]    Office Visit    1 year ago Primary osteoarthritis of left knee    [address]    Office Visit    1 year ago Encounter for annual health examination    [address]    Office Visit          Future Appointments         Provider Department Appt Notes    In 3 weeks Nava Guillory MD [address] [place] supervisit                    Passed - Medication is active on med list

## 2025-02-06 LAB
ABSOLUTE BASOPHILS: 53 CELLS/UL (ref 0–200)
ABSOLUTE EOSINOPHILS: 502 CELLS/UL (ref 15–500)
ABSOLUTE LYMPHOCYTES: 2561 CELLS/UL (ref 850–3900)
ABSOLUTE MONOCYTES: 585 CELLS/UL (ref 200–950)
ABSOLUTE NEUTROPHILS: 3899 CELLS/UL (ref 1500–7800)
ALBUMIN/GLOBULIN RATIO: 1.5 (CALC) (ref 1–2.5)
ALBUMIN: 4.3 G/DL (ref 3.6–5.1)
ALKALINE PHOSPHATASE: 77 U/L (ref 37–153)
ALT: 23 U/L (ref 6–29)
AST: 25 U/L (ref 10–35)
BASOPHILS: 0.7 %
BILIRUBIN, TOTAL: 0.3 MG/DL (ref 0.2–1.2)
BUN: 13 MG/DL (ref 7–25)
CALCIUM: 9.6 MG/DL (ref 8.6–10.4)
CARBON DIOXIDE: 26 MMOL/L (ref 20–32)
CHLORIDE: 101 MMOL/L (ref 98–110)
CHOL/HDLC RATIO: 2.8 (CALC)
CHOLESTEROL, TOTAL: 177 MG/DL
CREATININE, RANDOM URINE: 42 MG/DL (ref 20–275)
CREATININE: 0.97 MG/DL (ref 0.6–1)
EGFR: 62 ML/MIN/1.73M2
EOSINOPHILS: 6.6 %
GLOBULIN: 2.9 G/DL (CALC) (ref 1.9–3.7)
GLUCOSE: 97 MG/DL (ref 65–99)
HDL CHOLESTEROL: 64 MG/DL
HEMATOCRIT: 35.7 % (ref 35–45)
HEMOGLOBIN A1C: 6.4 % OF TOTAL HGB
HEMOGLOBIN: 11.4 G/DL (ref 11.7–15.5)
LDL-CHOLESTEROL: 94 MG/DL (CALC)
LYMPHOCYTES: 33.7 %
MCH: 27 PG (ref 27–33)
MCHC: 31.9 G/DL (ref 32–36)
MCV: 84.6 FL (ref 80–100)
MICROALBUMIN: <0.2 MG/DL
MONOCYTES: 7.7 %
MPV: 11.9 FL (ref 7.5–12.5)
NEUTROPHILS: 51.3 %
NON-HDL CHOLESTEROL: 113 MG/DL (CALC)
PLATELET COUNT: 301 THOUSAND/UL (ref 140–400)
POTASSIUM: 4.7 MMOL/L (ref 3.5–5.3)
PROTEIN, TOTAL: 7.2 G/DL (ref 6.1–8.1)
RDW: 13.1 % (ref 11–15)
RED BLOOD CELL COUNT: 4.22 MILLION/UL (ref 3.8–5.1)
SODIUM: 136 MMOL/L (ref 135–146)
TRIGLYCERIDES: 98 MG/DL
TSH: 4.61 MIU/L (ref 0.4–4.5)
VITAMIN D, 25-OH, TOTAL: 43 NG/ML (ref 30–100)
WHITE BLOOD CELL COUNT: 7.6 THOUSAND/UL (ref 3.8–10.8)

## 2025-02-19 ENCOUNTER — OFFICE VISIT (OUTPATIENT)
Dept: FAMILY MEDICINE CLINIC | Facility: CLINIC | Age: 70
End: 2025-02-19
Payer: MEDICARE

## 2025-02-19 ENCOUNTER — HOSPITAL ENCOUNTER (OUTPATIENT)
Dept: GENERAL RADIOLOGY | Age: 70
Discharge: HOME | End: 2025-02-19
Attending: FAMILY MEDICINE
Payer: MEDICARE

## 2025-02-19 VITALS
OXYGEN SATURATION: 98 % | DIASTOLIC BLOOD PRESSURE: 82 MMHG | RESPIRATION RATE: 18 BRPM | WEIGHT: 126 LBS | BODY MASS INDEX: 26.09 KG/M2 | TEMPERATURE: 98 F | HEART RATE: 62 BPM | HEIGHT: 58.27 IN | SYSTOLIC BLOOD PRESSURE: 120 MMHG

## 2025-02-19 DIAGNOSIS — Z12.31 VISIT FOR SCREENING MAMMOGRAM: ICD-10-CM

## 2025-02-19 DIAGNOSIS — I10 ESSENTIAL HYPERTENSION: ICD-10-CM

## 2025-02-19 DIAGNOSIS — G89.29 CHRONIC PAIN OF LEFT KNEE: ICD-10-CM

## 2025-02-19 DIAGNOSIS — M25.562 CHRONIC PAIN OF LEFT KNEE: ICD-10-CM

## 2025-02-19 DIAGNOSIS — Z71.84 TRAVEL ADVICE ENCOUNTER: ICD-10-CM

## 2025-02-19 DIAGNOSIS — E78.00 PURE HYPERCHOLESTEROLEMIA: ICD-10-CM

## 2025-02-19 DIAGNOSIS — N18.31 STAGE 3A CHRONIC KIDNEY DISEASE (HCC): ICD-10-CM

## 2025-02-19 DIAGNOSIS — Z00.00 ENCOUNTER FOR ANNUAL HEALTH EXAMINATION: Primary | ICD-10-CM

## 2025-02-19 DIAGNOSIS — R00.2 PALPITATIONS: ICD-10-CM

## 2025-02-19 DIAGNOSIS — E11.65 TYPE 2 DIABETES MELLITUS WITH HYPERGLYCEMIA, WITHOUT LONG-TERM CURRENT USE OF INSULIN (HCC): ICD-10-CM

## 2025-02-19 PROCEDURE — 3008F BODY MASS INDEX DOCD: CPT | Performed by: FAMILY MEDICINE

## 2025-02-19 PROCEDURE — 1170F FXNL STATUS ASSESSED: CPT | Performed by: FAMILY MEDICINE

## 2025-02-19 PROCEDURE — 3044F HG A1C LEVEL LT 7.0%: CPT | Performed by: FAMILY MEDICINE

## 2025-02-19 PROCEDURE — 3074F SYST BP LT 130 MM HG: CPT | Performed by: FAMILY MEDICINE

## 2025-02-19 PROCEDURE — 3061F NEG MICROALBUMINURIA REV: CPT | Performed by: FAMILY MEDICINE

## 2025-02-19 PROCEDURE — 3079F DIAST BP 80-89 MM HG: CPT | Performed by: FAMILY MEDICINE

## 2025-02-19 PROCEDURE — 99214 OFFICE O/P EST MOD 30 MIN: CPT | Performed by: FAMILY MEDICINE

## 2025-02-19 PROCEDURE — G0439 PPPS, SUBSEQ VISIT: HCPCS | Performed by: FAMILY MEDICINE

## 2025-02-19 PROCEDURE — 96160 PT-FOCUSED HLTH RISK ASSMT: CPT | Performed by: FAMILY MEDICINE

## 2025-02-19 PROCEDURE — 1125F AMNT PAIN NOTED PAIN PRSNT: CPT | Performed by: FAMILY MEDICINE

## 2025-02-19 PROCEDURE — 73562 X-RAY EXAM OF KNEE 3: CPT | Performed by: FAMILY MEDICINE

## 2025-02-19 RX ORDER — ROSUVASTATIN CALCIUM 10 MG/1
10 TABLET, COATED ORAL NIGHTLY
Qty: 90 TABLET | Refills: 1 | Status: SHIPPED | OUTPATIENT
Start: 2025-02-19

## 2025-02-19 RX ORDER — METOPROLOL TARTRATE 25 MG/1
25 TABLET, FILM COATED ORAL DAILY
Qty: 90 TABLET | Refills: 1 | Status: SHIPPED | OUTPATIENT
Start: 2025-02-19

## 2025-02-19 RX ORDER — AMLODIPINE BESYLATE 5 MG/1
5 TABLET ORAL DAILY
Qty: 90 TABLET | Refills: 1 | Status: SHIPPED | OUTPATIENT
Start: 2025-02-19

## 2025-02-19 RX ORDER — LOSARTAN POTASSIUM AND HYDROCHLOROTHIAZIDE 12.5; 5 MG/1; MG/1
1 TABLET ORAL DAILY
Qty: 90 TABLET | Refills: 1 | Status: SHIPPED | OUTPATIENT
Start: 2025-02-19

## 2025-02-19 NOTE — PROGRESS NOTES
Assessment & Plan  Encounter for annual health examination         Visit for screening mammogram    Orders:    3D Mammogram Digital Screen, Bilateral (CPT=77067/79179); Future; Expected date: 02/19/2025    Essential hypertension  -  continue the same dose of medication  -  DASH diet, limit salt to less than 2000 mg  -  Goal BP less than 130/80    Orders:    amLODIPine Besylate; Take 1 tablet (5 mg total) by mouth daily.  Dispense: 90 tablet; Refill: 1    Losartan Potassium-HCTZ; Take 1 tablet by mouth daily.  Dispense: 90 tablet; Refill: 1    Metoprolol Tartrate; Take 1 tablet (25 mg total) by mouth daily.  Dispense: 90 tablet; Refill: 1    Detailed, Mod Complex (92531)    Palpitations  Controlled, asymptomatic  Continue the same dose of medication  Orders:    Metoprolol Tartrate; Take 1 tablet (25 mg total) by mouth daily.  Dispense: 90 tablet; Refill: 1    Detailed, Mod Complex (62312)    Pure hypercholesterolemia  -controlled  - continue the same dose of medication  Orders:    Rosuvastatin Calcium; Take 1 tablet (10 mg total) by mouth nightly.  Dispense: 90 tablet; Refill: 1    Detailed, Mod Complex (31253)    Type 2 diabetes mellitus with hyperglycemia, without long-term current use of insulin (HCC)  - diet controlled  - encouraged to continue to limit refined sugars and carbs in diet  Orders:    Detailed, Mod Complex (96747)    Ophthalmology Referral - In Network    Chronic pain of left knee    Orders:    Detailed, Mod Complex (30222)    XR KNEE (3 VIEWS), LEFT (CPT=73562); Future; Expected date: 02/19/2025    Travel advice encounter    Orders:    Travel Medicine    Stage 3a chronic kidney disease (HCC)  -likely secondary to chronic HTN  - encouraged good BP control  - low salt diet       Health Maintenance   Topic Date Due    Diabetes Care Dilated Eye Exam  Never done    Zoster Vaccines (1 of 2) Never done    Annual Well Visit  01/01/2025    Diabetes Care: Foot Exam (Annual)  01/01/2025    Mammogram   02/14/2025    COVID-19 Vaccine (6 - 2024-25 season) 03/19/2025 (Originally 9/1/2024)    Influenza Vaccine (1) 06/30/2025 (Originally 10/1/2024)    Colorectal Cancer Screening  07/17/2025    Diabetes Care A1C  08/05/2025    Diabetes Care: GFR  02/05/2026    DEXA Scan  Completed    Annual Depression Screening  Completed    Fall Risk Screening (Annual)  Completed    Diabetes Care: Microalb/Creat Ratio (Annual)  Completed    Pneumococcal Vaccine: 50+ Years  Completed    Meningococcal B Vaccine  Aged Out     The patient indicates understanding of these issues and agrees to the plan.  Reinforced healthy diet, lifestyle, and exercise.      Return in 6 months (on 8/19/2025).     Nava Guillory MD          Subjective:   Gloria Paz is a 70 year old female who presents for a MA AHA (Medicare Advantage Annual Health Assessment) and Subsequent Annual Wellness visit (Pt already had Initial Annual Wellness) and scheduled follow up of multiple significant but stable problems.   Has not done colon ca screening, denies constipation or blood in stool.  Mammogram done last year was normal.    [place]ravelling to [place] in summer and needs information for travel clinic for vaccination    Left knee got locked on Monday, applied some otc medication she had from [facility] nd is better now, states has seen ortho in the past and has osteoarthritis in the knee.    History/Other:   Fall Risk Assessment:   She has been screened for Falls and is low risk.      Cognitive Assessment:   She had a completely normal cognitive assessment - see flowsheet entries     Functional Ability/Status:   Gloria Paz has some abnormal functions as listed below:  She has Hearing problems based on screening of functional status.      Depression Screening (PHQ):  PHQ-2 SCORE: 0  , done 2/19/2025             Advanced Directives:   She does NOT have a Living Will. [Do you have a living will?: No]  She does NOT have a Power of  for Health  Care. [Do you have a healthcare power of ?: No]  Discussed Advance Care Planning with patient (and family/surrogate if present). Standard forms made available to patient in After Visit Summary.      Patient Active Problem List   Diagnosis    Pure hypercholesterolemia    Essential hypertension    CKD (chronic kidney disease) stage 3, GFR 30-59 ml/min ([facility])    Prediabetes    Anxiety    Sciatica of left side    New onset type 2 diabetes mellitus (HCC)    Type 2 diabetes mellitus with hyperglycemia, without long-term current use of insulin ([facility])     Allergies:  She is allergic to atorvastatin.    Current Medications:  Outpatient Medications Marked as Taking for the 2/19/25 encounter (Office Visit) with Nava Guillory MD   Medication Sig    amLODIPine 5 MG Oral Tab Take 1 tablet (5 mg total) by mouth daily.    losartan-hydroCHLOROthiazide 50-12.5 MG Oral Tab Take 1 tablet by mouth daily.    metoprolol tartrate 25 MG Oral Tab Take 1 tablet (25 mg total) by mouth daily.    rosuvastatin 10 MG Oral Tab Take 1 tablet (10 mg total) by mouth nightly.    cetirizine 10 MG Oral Tab Take 1 tablet (10 mg total) by mouth daily.    azithromycin 500 MG Oral Tab Take 1 tablet (500 mg total) by mouth daily. (Patient not taking: Reported on 3/6/2025)    Meloxicam 7.5 MG Oral Tab Take 1 tablet (7.5 mg total) by mouth daily. (Patient not taking: Reported on 3/6/2025)    fluticasone propionate 50 MCG/ACT Nasal Suspension 1 spray by Nasal route daily. One spray per each nostril daily. (Patient not taking: Reported on 3/6/2025)    albuterol (PROAIR HFA) 108 (90 Base) MCG/ACT Inhalation Aero Soln 1-2 puffs every 4-6 hours during the day for 2 days then as needed. (Patient not taking: Reported on 3/6/2025)    Calcium Carb-Cholecalciferol (OYSTER SHELL CALCIUM) 500-400 MG-UNIT Oral Tab Take 1 tablet by mouth daily.    aspirin 81 MG Oral Tab EC Take 1 tablet (81 mg total) by mouth daily.       Medical History:  She  has a past medical  history of Eczema, Other and unspecified hyperlipidemia, and Unspecified essential hypertension.  Surgical History:  She  has no past surgical history on file.   Family History:  Her family history includes Diabetes in her father; Hypertension in her brother and mother; Stroke in her mother.  Social History:  She  reports that she has never smoked. She has never used smokeless tobacco. She reports that she does not drink alcohol and does not use drugs.    Tobacco:  She has never smoked tobacco.    CAGE Alcohol Screen:   CAGE screening score of 0 on 2/19/2025, showing low risk of alcohol abuse.      Patient Care Team:  Nava Guillory MD as PCP - General (Family Medicine)  Rosalia Chambers PT as Physical Therapist  Eloy Mitchell PT (Physical Therapy)    Review of Systems   Constitutional:  Negative for appetite change, fatigue, fever and unexpected weight change.   HENT:  Negative for congestion, ear pain, hearing loss and sore throat.    Eyes:  Negative for discharge, redness and visual disturbance.   Respiratory:  Negative for cough, chest tightness and shortness of breath.    Cardiovascular:  Negative for chest pain and palpitations.   Gastrointestinal:  Negative for abdominal pain, blood in stool, constipation and nausea.   Endocrine: Negative for cold intolerance, heat intolerance and polyuria.   Genitourinary:  Negative for difficulty urinating, dysuria, frequency and urgency.   Musculoskeletal:  Positive for arthralgias. Negative for gait problem, joint swelling and myalgias.   Skin:  Negative for rash.   Allergic/Immunologic: Negative for food allergies.   Neurological:  Negative for dizziness, weakness, numbness and headaches.   Hematological:  Negative for adenopathy. Does not bruise/bleed easily.   Psychiatric/Behavioral:  Negative for dysphoric mood and sleep disturbance. The patient is not nervous/anxious.           Objective:   Physical Exam  Constitutional:       General: She is not in acute  distress.     Appearance: Normal appearance. She is well-developed.   HENT:      Head: Normocephalic and atraumatic.      Right Ear: Tympanic membrane, ear canal and external ear normal.      Left Ear: Tympanic membrane, ear canal and external ear normal.      Nose: Nose normal.      Mouth/Throat:      Mouth: Mucous membranes are moist.      Pharynx: Oropharynx is clear.   Eyes:      Pupils: Pupils are equal, round, and reactive to light.   Neck:      Thyroid: No thyromegaly.      Vascular: No carotid bruit.   Cardiovascular:      Rate and Rhythm: Normal rate and regular rhythm.      Pulses: Normal pulses.      Heart sounds: Normal heart sounds. No murmur heard.  Pulmonary:      Effort: Pulmonary effort is normal. No respiratory distress.      Breath sounds: Normal breath sounds.   Chest:      Chest wall: No tenderness.   Abdominal:      General: Abdomen is flat. Bowel sounds are normal. There is no distension.      Palpations: Abdomen is soft. There is no mass.      Tenderness: There is no abdominal tenderness.      Comments: No HSM   Musculoskeletal:         General: Normal range of motion.      Cervical back: Normal range of motion and neck supple.      Right lower leg: No edema.      Left lower leg: No edema.   Lymphadenopathy:      Cervical: No cervical adenopathy.   Skin:     General: Skin is warm.      Findings: No rash.   Neurological:      General: No focal deficit present.      Mental Status: She is alert and oriented to person, place, and time.      Cranial Nerves: No cranial nerve deficit.      Sensory: No sensory deficit.      Motor: No weakness.      Coordination: Coordination normal.      Gait: Gait normal.      Deep Tendon Reflexes: Reflexes are normal and symmetric. Reflexes normal.   Psychiatric:         Mood and Affect: Mood normal.         Behavior: Behavior normal.         Thought Content: Thought content normal.         Judgment: Judgment normal.       /82   Pulse 62   Temp 97.9 °F  (36.6 °C) (Temporal)   Resp 18   Ht 4' 10.27\" (1.48 m)   Wt 126 lb (57.2 kg)   SpO2 98%   BMI 26.09 kg/m²  Estimated body mass index is 26.09 kg/m² as calculated from the following:    Height as of this encounter: 4' 10.27\" (1.48 m).    Weight as of this encounter: 126 lb (57.2 kg).    Medicare Hearing Assessment:   Hearing Screening    Screening Method: Finger Rub  Finger Rub Result: Pass         Visual Acuity:   Right Eye Visual Acuity: Uncorrected Right Eye Chart Acuity: 20/20   Left Eye Visual Acuity: Uncorrected Left Eye Chart Acuity: 20/20   Both Eyes Visual Acuity: Uncorrected Both Eyes Chart Acuity: 20/20   Able To Tolerate Visual Acuity: Yes          Component      Latest Ref [facility] 2/5/2025   WBC      3.8 - 10.8 Thousand/uL 7.6    RBC      3.80 - 5.10 Million/uL 4.22    Hemoglobin      11.7 - 15.5 g/dL 11.4 (L)    Hematocrit      35.0 - 45.0 % 35.7    MCV      80.0 - 100.0 fL 84.6    MCH      27.0 - 33.0 pg 27.0    MCHC      32.0 - 36.0 g/dL 31.9 (L)    RDW      11.0 - 15.0 % 13.1    Platelet Count      140 - 400 Thousand/uL 301    MPV      7.5 - 12.5 fL 11.9    Neutrophils Absolute      1,500 - 7,800 cells/uL 3,899    Lymphocytes Absolute      850 - 3,900 cells/uL 2,561    Monocytes Absolute      200 - 950 cells/uL 585    Eosinophils Absolute      15 - 500 cells/uL 502 (H)    Basophils Absolute      0 - 200 cells/uL 53    Neutrophils %      % 51.3    Lymphocytes %      % 33.7    Monocytes %      % 7.7    Eosinophils %      % 6.6    Basophils %      % 0.7    Glucose      65 - 99 mg/dL 97    BUN      7 - 25 mg/dL 13    CREATININE      0.60 - 1.00 mg/dL 0.97    EGFR      > OR = 60 mL/min/1.73m2 62    BUN/CREATININE RATIO      6 - 22 (calc) SEE NOTE:    Sodium      135 - 146 mmol/L 136    Potassium      3.5 - 5.3 mmol/L 4.7    Chloride      98 - 110 mmol/L 101    Carbon Dioxide, Total      20 - 32 mmol/L 26    CALCIUM      8.6 - 10.4 mg/dL 9.6    PROTEIN, TOTAL      6.1 - 8.1 g/dL 7.2    Albumin      3.6 -  5.1 g/dL 4.3    Globulin      1.9 - 3.7 g/dL (calc) 2.9    A/G Ratio      1.0 - 2.5 (calc) 1.5    Total Bilirubin      0.2 - 1.2 mg/dL 0.3    Alkaline Phosphatase      37 - 153 U/L 77    AST (SGOT)      10 - 35 U/L 25    ALT (SGPT)      6 - 29 U/L 23    Cholesterol, Total      <200 mg/dL 177    HDL Cholesterol      > OR = 50 mg/dL 64    Triglycerides      <150 mg/dL 98    LDL Cholesterol Calc      mg/dL (calc) 94    Chol/HDL Ratio      <5.0 (calc) 2.8    NON-HDL CHOLESTEROL      <130 mg/dL (calc) 113    CREATININE, RANDOM URINE      20 - 275 mg/dL 42    MICROALBUMIN      See Note: mg/dL <0.2    MICROALBUMIN/CREATININE RATIO, RANDOM URINE      <30 mg/g creat NOTE    TSH      0.40 - 4.50 mIU/L 4.61 (H)    HEMOGLOBIN A1c      <5.7 % of total Hgb 6.4 (H)    VITAMIN D, 25-OH, TOTAL      30 - 100 ng/mL 43       Legend:  (L) Low  (H) High  Supplementary Documentation:   General Health:  In the past six months, have you lost more than 10 pounds without trying?: 2 - No  Has your appetite been poor?: No  Type of Diet: Vegetarian  How does the patient maintain a good energy level?: Appropriate Exercise  How would you describe your daily physical activity?: Heavy  How would you describe your current health state?: Good  How do you maintain positive mental well-being?: Social Interaction  On a scale of 0 to 10, with 0 being no pain and 10 being severe pain, what is your pain level?: 5 - (Moderate)  In the past six months, have you experienced urine leakage?: 0-No  At any time do you feel concerned for the safety/well-being of yourself and/or your children, in your home or elsewhere?: No  Have you had any immunizations at another office such as Influenza, Hepatitis B, Tetanus, or Pneumococcal?: No       The 21st Century Cures Act makes medical notes like these available to patients in the interest of transparency. Please be advised this is a medical document. Medical documents are intended to carry relevant information, facts as  evident, and the clinical opinion of the practitioner. The medical note is intended as peer to peer communication and may appear blunt or direct. It is written in medical language and may contain abbreviations or verbiage that are unfamiliar.

## 2025-02-19 NOTE — PATIENT INSTRUCTIONS
Component      Latest Ref Rn 2/5/2025   WBC      3.8 - 10.8 Thousand/uL 7.6    RBC      3.80 - 5.10 Million/uL 4.22    Hemoglobin      11.7 - 15.5 g/dL 11.4 (L)    Hematocrit      35.0 - 45.0 % 35.7    MCV      80.0 - 100.0 fL 84.6    MCH      27.0 - 33.0 pg 27.0    MCHC      32.0 - 36.0 g/dL 31.9 (L)    RDW      11.0 - 15.0 % 13.1    Platelet Count      140 - 400 Thousand/uL 301    MPV      7.5 - 12.5 fL 11.9    Neutrophils Absolute      1,500 - 7,800 cells/uL 3,899    Lymphocytes Absolute      850 - 3,900 cells/uL 2,561    Monocytes Absolute      200 - 950 cells/uL 585    Eosinophils Absolute      15 - 500 cells/uL 502 (H)    Basophils Absolute      0 - 200 cells/uL 53    Neutrophils %      % 51.3    Lymphocytes %      % 33.7    Monocytes %      % 7.7    Eosinophils %      % 6.6    Basophils %      % 0.7    Glucose      65 - 99 mg/dL 97    BUN      7 - 25 mg/dL 13    CREATININE      0.60 - 1.00 mg/dL 0.97    EGFR      > OR = 60 mL/min/1.73m2 62    BUN/CREATININE RATIO      6 - 22 (calc) SEE NOTE:    Sodium      135 - 146 mmol/L 136    Potassium      3.5 - 5.3 mmol/L 4.7    Chloride      98 - 110 mmol/L 101    Carbon Dioxide, Total      20 - 32 mmol/L 26    CALCIUM      8.6 - 10.4 mg/dL 9.6    PROTEIN, TOTAL      6.1 - 8.1 g/dL 7.2    Albumin      3.6 - 5.1 g/dL 4.3    Globulin      1.9 - 3.7 g/dL (calc) 2.9    A/G Ratio      1.0 - 2.5 (calc) 1.5    Total Bilirubin      0.2 - 1.2 mg/dL 0.3    Alkaline Phosphatase      37 - 153 U/L 77    AST (SGOT)      10 - 35 U/L 25    ALT (SGPT)      6 - 29 U/L 23    Cholesterol, Total      <200 mg/dL 177    HDL Cholesterol      > OR = 50 mg/dL 64    Triglycerides      <150 mg/dL 98    LDL Cholesterol Calc      mg/dL (calc) 94    Chol/HDL Ratio      <5.0 (calc) 2.8    NON-HDL CHOLESTEROL      <130 mg/dL (calc) 113    CREATININE, RANDOM URINE      20 - 275 mg/dL 42    MICROALBUMIN      See Note: mg/dL <0.2    MICROALBUMIN/CREATININE RATIO, RANDOM URINE      <30 mg/g creat NOTE     TSH      0.40 - 4.50 mIU/L 4.61 (H)    HEMOGLOBIN A1c      <5.7 % of total Hgb 6.4 (H)    VITAMIN D, 25-OH, TOTAL      30 - 100 ng/mL 43       Legend:  (L) Low  (H) High

## 2025-02-24 NOTE — ASSESSMENT & PLAN NOTE
- diet controlled  - encouraged to continue to limit refined sugars and carbs in diet  Orders:    Detailed, Mod Complex (22489)    Ophthalmology Referral - In Network

## 2025-02-24 NOTE — ASSESSMENT & PLAN NOTE
-likely secondary to chronic HTN  - encouraged good BP control  - low salt diet       Health Maintenance   Topic Date Due    Diabetes Care Dilated Eye Exam  Never done    Zoster Vaccines (1 of 2) Never done    Annual Well Visit  01/01/2025    Diabetes Care: Foot Exam (Annual)  01/01/2025    Mammogram  02/14/2025    COVID-19 Vaccine (6 - 2024-25 season) 03/19/2025 (Originally 9/1/2024)    Influenza Vaccine (1) 06/30/2025 (Originally 10/1/2024)    Colorectal Cancer Screening  07/17/2025    Diabetes Care A1C  08/05/2025    Diabetes Care: GFR  02/05/2026    DEXA Scan  Completed    Annual Depression Screening  Completed    Fall Risk Screening (Annual)  Completed    Diabetes Care: Microalb/Creat Ratio (Annual)  Completed    Pneumococcal Vaccine: 50+ Years  Completed    Meningococcal B Vaccine  Aged Out     The patient indicates understanding of these issues and agrees to the plan.  Reinforced healthy diet, lifestyle, and exercise.      Return in 6 months (on 8/19/2025).     Nava Guillory MD

## 2025-02-24 NOTE — ASSESSMENT & PLAN NOTE
-  continue the same dose of medication  -  DASH diet, limit salt to less than 2000 mg  -  Goal BP less than 130/80    Orders:    amLODIPine Besylate; Take 1 tablet (5 mg total) by mouth daily.  Dispense: 90 tablet; Refill: 1    Losartan Potassium-HCTZ; Take 1 tablet by mouth daily.  Dispense: 90 tablet; Refill: 1    Metoprolol Tartrate; Take 1 tablet (25 mg total) by mouth daily.  Dispense: 90 tablet; Refill: 1    Detailed, Mod Complex (62864)

## 2025-02-24 NOTE — ASSESSMENT & PLAN NOTE
-controlled  - continue the same dose of medication  Orders:    Rosuvastatin Calcium; Take 1 tablet (10 mg total) by mouth nightly.  Dispense: 90 tablet; Refill: 1    Detailed, Mod Complex (69553)

## 2025-02-25 DIAGNOSIS — G89.29 CHRONIC PAIN OF LEFT KNEE: Primary | ICD-10-CM

## 2025-02-25 DIAGNOSIS — M25.562 CHRONIC PAIN OF LEFT KNEE: Primary | ICD-10-CM

## 2025-02-28 ENCOUNTER — TELEPHONE (OUTPATIENT)
Facility: CLINIC | Age: 70
End: 2025-02-28

## 2025-02-28 DIAGNOSIS — M25.562 PAIN IN BOTH KNEES, UNSPECIFIED CHRONICITY: Primary | ICD-10-CM

## 2025-02-28 DIAGNOSIS — M25.561 PAIN IN BOTH KNEES, UNSPECIFIED CHRONICITY: Primary | ICD-10-CM

## 2025-02-28 NOTE — TELEPHONE ENCOUNTER
Patient has scheduled an appointment for Bilateral Knee pain . Patient is aware to arrive 20 minutes prior to appointment for possible xrays. Please advise.     Future Appointments   Date Time Provider Department Center   3/6/2025  3:00 PM Saad Newman MD [facility] [address]   8/13/2025  8:20 AM Nava Guillory MD [facility] [address] [facility] [address]         Thank you

## 2025-03-03 ENCOUNTER — TELEPHONE (OUTPATIENT)
Dept: FAMILY MEDICINE CLINIC | Facility: CLINIC | Age: 70
End: 2025-03-03

## 2025-03-03 NOTE — TELEPHONE ENCOUNTER
Referral:  Gloria Paz  LOV: 2/19/2025  Calling for a referral to:    Physician and Practice Name: Ophthalmologist    Specialty: Eye Specialist - Dr. Green (same office as Dr. Kessler)   Concern/condition: Diabetes   Does patient have a scheduled Appointment?: March 17, 2025    Referral:  Gloria Paz  LOV: 2/19/2025  Calling for a referral to:    Physician and Practice Name: Ortho   Specialty: Dr. Newman   Concern/condition: Pain in both knees   Does patient have a scheduled Appointment?: No appointment scheduled yet    It appears patient may have these referrals in the system.  However, she called today to make sure.

## 2025-03-05 NOTE — TELEPHONE ENCOUNTER
Pt states she is also having a little pain in her right knee and would like ortho referral to specify it is for pain in both knees. She states she discussed this with Dr. Guillory at last office visit?    Dr. Guillory - was this discussed - ok to add dx for right knee pain?

## 2025-03-05 NOTE — TELEPHONE ENCOUNTER
2nd attempt to contact.  Lm for pt (Ok per HIPAA) to inform as we have not heard back, referrals to Ophtha and Ortho placed and authorized as requested. To call back at the office if any further questions.

## 2025-03-06 ENCOUNTER — HOSPITAL ENCOUNTER (OUTPATIENT)
Dept: GENERAL RADIOLOGY | Age: 70
Discharge: HOME | End: 2025-03-06
Attending: STUDENT IN AN ORGANIZED HEALTH CARE EDUCATION/TRAINING PROGRAM
Payer: MEDICARE

## 2025-03-06 ENCOUNTER — OFFICE VISIT (OUTPATIENT)
Facility: CLINIC | Age: 70
End: 2025-03-06
Payer: MEDICARE

## 2025-03-06 VITALS — WEIGHT: 126 LBS | HEIGHT: 58 IN | BODY MASS INDEX: 26.45 KG/M2

## 2025-03-06 DIAGNOSIS — M25.562 PAIN IN BOTH KNEES, UNSPECIFIED CHRONICITY: ICD-10-CM

## 2025-03-06 DIAGNOSIS — M17.11 PRIMARY OSTEOARTHRITIS OF RIGHT KNEE: ICD-10-CM

## 2025-03-06 DIAGNOSIS — M17.12 PRIMARY OSTEOARTHRITIS OF LEFT KNEE: Primary | ICD-10-CM

## 2025-03-06 DIAGNOSIS — M25.561 PAIN IN BOTH KNEES, UNSPECIFIED CHRONICITY: ICD-10-CM

## 2025-03-06 PROCEDURE — 73564 X-RAY EXAM KNEE 4 OR MORE: CPT | Performed by: STUDENT IN AN ORGANIZED HEALTH CARE EDUCATION/TRAINING PROGRAM

## 2025-03-06 PROCEDURE — 99204 OFFICE O/P NEW MOD 45 MIN: CPT | Performed by: STUDENT IN AN ORGANIZED HEALTH CARE EDUCATION/TRAINING PROGRAM

## 2025-03-06 PROCEDURE — 3008F BODY MASS INDEX DOCD: CPT | Performed by: STUDENT IN AN ORGANIZED HEALTH CARE EDUCATION/TRAINING PROGRAM

## 2025-03-06 PROCEDURE — 1160F RVW MEDS BY RX/DR IN RCRD: CPT | Performed by: STUDENT IN AN ORGANIZED HEALTH CARE EDUCATION/TRAINING PROGRAM

## 2025-03-06 PROCEDURE — 1159F MED LIST DOCD IN RCRD: CPT | Performed by: STUDENT IN AN ORGANIZED HEALTH CARE EDUCATION/TRAINING PROGRAM

## 2025-03-06 NOTE — PROGRESS NOTES
Orthopaedic Surgery  [id number] [address] [address]   [phone]      Chief Complaint:  Bilateral Knee Pain    History of Present Illness:   Gloria Paz is a 70 year old female seen in clinic today as new for evaluation of their bilateral knees. The left is worse than the right. This has been ongoing for several years. Pain is located over the anterior aspect of the knees. It seems to be more medially based on the left side. Their joint pain interferes with their activities of daily life such as when first getting up in the AM. Associated with their joint pain, they report stiffness. Their symptoms are worsened by weightbearing activity and as the day progresses. Their symptoms are made better by rest. No prior surgeries to the involved knee.    Prior imaging studies have included XRs of the left knee.  Treatment so far has consisted of conservative management including physical therapy many years ago. She continues to do some home exercises. These have provided partial benefit.  She takes Ibuprofen if needed but tries to take it sparingly.    Assistive devices used: none        PMH/PSH/Family History/Social History/Meds/Allergies:   Past Medical History:    Eczema    Other and unspecified hyperlipidemia    Unspecified essential hypertension        No past surgical history on file.     Family History   Problem Relation Age of Onset    Diabetes Father     Hypertension Mother     Stroke Mother     Hypertension Brother         Social History     Socioeconomic History    Marital status:      Spouse name: Naman    Number of children: 2    Years of education: Not on file    Highest education level: Not on file   Occupational History    Occupation: floor sales     Employer: [employer]   Tobacco Use    Smoking status: Never    Smokeless tobacco: Never   Vaping Use    Vaping status: Never Used   Substance and Sexual Activity    Alcohol use: No     Alcohol/week: 0.0 standard drinks of  alcohol    Drug use: No    Sexual activity: Not on file   Other Topics Concern     Service Not Asked    Blood Transfusions Not Asked    Caffeine Concern Yes     Comment: tea    Occupational Exposure Not Asked    Hobby Hazards Not Asked    Sleep Concern Not Asked    Stress Concern Not Asked    Weight Concern Not Asked    Special Diet Not Asked    Back Care Not Asked    Exercise Yes    Bike Helmet Not Asked    Seat Belt Not Asked    Self-Exams Not Asked   Social History Narrative    vegetarian     Social Drivers of Health     Food Insecurity: Not on file   Transportation Needs: Not on file   Stress: Not on file   Housing Stability: Not on file        Current Outpatient Medications   Medication Instructions    albuterol (PROAIR HFA) 108 (90 Base) MCG/ACT Inhalation Aero Soln 1-2 puffs every 4-6 hours during the day for 2 days then as needed.    amLODIPine (NORVASC) 5 mg, Oral, Daily    aspirin 81 mg, Daily    azithromycin (ZITHROMAX) 500 mg, Oral, Daily    Calcium Carb-Cholecalciferol (OYSTER SHELL CALCIUM) 500-400 MG-UNIT Oral Tab 1 tablet, Daily    cetirizine (ZYRTEC) 10 mg, Oral, Daily    fluticasone propionate 50 MCG/ACT Nasal Suspension 1 spray, Nasal, Daily, One spray per each nostril daily.    losartan-hydroCHLOROthiazide 50-12.5 MG Oral Tab 1 tablet, Oral, Daily    Meloxicam 7.5 mg, Oral, Daily    metoprolol tartrate (LOPRESSOR) 25 mg, Oral, Daily    rosuvastatin (CRESTOR) 10 mg, Oral, Nightly        Allergies[1]       Physical Exam:   There were no vitals filed for this visit.  Estimated body mass index is 26.09 kg/m² as calculated from the following:    Height as of 2/19/25: 4' 10.27\" (1.48 m).    Weight as of 2/19/25: 126 lb (57.2 kg).    Constitutional: No acute distress, well nourished.  Eyes: Anicteric sclera, pink conjunctiva  Ears, Nose, Mouth and Throat: Normocephalic, atraumatic, moist mucous membranes  Cardiovascular: No pitting edema or varicosities in the lower extremities. Maneuvers  demonstrate a negative Meme's sign. No palpable cords in calf noted.   Respiratory: No respiratory distress, normal respiratory rhythm and effort   Neurological:  Oriented to person, place, and time.  Psychological:  Appropriate mood and affect.    Bilateral Knee Exam:      Inspection: No erythema, ecchymoses, or wounds. No rash. No previous incisions noted. No effusion. No appreciable quad atrophy  Alignment: varus on the left, valgus on the right  ROM: 0 - 135 degrees, flexion contracture: 0 degrees, quad lag: no  Stability: A/P stress: stable, firm endpoint, M/L stress: stable, firm endpoint at 0 and 30 degrees flexion  Extensor mechanism is intact  Pain or crepitus with ROM?: No. No pain with patellar grind  Non-tender at: medial joint line, lateral joint line, patella  Strength: Intact 5/5 strength SLR and TA/GS/FHL/EHL  Sensation: Grossly intact to light touch over SPN/DPN/Saph/Sural/Tibial nerve distributions  Vasc: Warm perfused extremity        Imaging:   XRs of the left knee were obtained with AP, sunrise, and lateral views on 2/19/25    They show moderate degenerative changes of the knee involving the medial compartment(s) with joint space narrowing, osteophyte formation, and subchondral sclerosis. No fracture or dislocation seen    XRs of the right knee were obtained with AP, PA Flex, sunrise, and lateral views today    They show severe degenerative changes of the knee involving the patellofemoral compartment(s) with joint space narrowing, osteophyte formation, and subchondral sclerosis. No fracture or dislocation seen    I personally reviewed and interpreted the radiographs.      Assessment:     ICD-10-CM    1. Primary osteoarthritis of left knee  M17.12       2. Primary osteoarthritis of right knee  M17.11              Plan:   At today's visit I discussed with the patient their diagnosis and the factors considered to form this diagnosis. Their history and physical exam and radiographic findings are  consistent with arthritis of the knees. As we discussed their diagnosis, information was provided regarding the underlying pathology and the natural course of this progressive condition.    During our discussion, we detailed various treatment options available. We counseled the patient on treatment options. Major joint arthritis is usually a chronic condition that can be effectively managed with conservative modalities. Once these conservative measures fail to provide reasonable therapeutic benefit, surgical procedures may be indicated.    Conservative measures include activity modification, home exercise programs, physical therapy, oral anti-inflammatories and acetaminophen, braces, assistive devices, and intraarticular injections.     In consideration of prior treatments, I have recommended continued conservative treatment with Tylenol + NSAIDs, combination of rest, ice, or elevation, and physical therapy. [facility] handout for knee exercises was provided for her today, as well as a PT referral. She would like to try the home exercises first.     If no improvement with 6-8 weeks of PT, she may follow up for a left knee intra-articular injection. She would like to hold off on this for now.    Thank you very much for allowing me to participate in the care of this patient. If you have any questions, please do not hesitate to contact me.      Saad Newman MD  Adult Hip and Knee Reconstruction    Department of Orthopaedic Surgery  [facility]     [address] [place] [address]  [address] [place] [id number]     t: [phone]  f: [phone]       [email or web address]         [1]   Allergies  Allergen Reactions    Atorvastatin MYALGIA

## 2025-03-12 ENCOUNTER — TELEPHONE (OUTPATIENT)
Dept: FAMILY MEDICINE CLINIC | Facility: CLINIC | Age: 70
End: 2025-03-12

## 2025-03-12 DIAGNOSIS — E11.65 TYPE 2 DIABETES MELLITUS WITH HYPERGLYCEMIA, WITHOUT LONG-TERM CURRENT USE OF INSULIN (HCC): Primary | ICD-10-CM

## 2025-03-12 NOTE — TELEPHONE ENCOUNTER
Gloria Paz called requesting a referral  LOV: 2/19/2025    Patient requesting referral to Dr.Mateen Rock (provider and practice name)   Is this referral for a new concern or existing concern: consult   Has the patient been seen in our office for this concern? yes  If patient is requesting a new referral for a new concern - please offer appointment with PCP first. Did patient accept an appointment:   Specialty: Opthalmology  Practice address: [address] [place] [facility] [address]  Practice phone number: [phone]  Does patient have a scheduled appointment already with the specialist?: yes 3/17/25

## 2025-03-12 NOTE — TELEPHONE ENCOUNTER
Confirmed with referrals due to pt's [employer] HMO requires new referral for alternate provider in same group.  New Ophtha referral placed #[id number] in open status as will be reviewed and pended for insurance determination.     Lm for pt (Ok per HIPAA) to inform of new Ophtha referral placed to Dr. Rock as requested. Referrals aware of OV 3/17 and will be working on referral auth.  Provided pt with referrals contact info with referral# for status update. To call back at the office if any further questions.

## 2025-03-17 NOTE — TELEPHONE ENCOUNTER
Triage call transferred.   Spoke with pt, f/u on Ophtha referral.  Noted Ophtha referral Authorized:  Certification Number  [id number]  Service From - To Date  2025-03-12 - 2025-06-12    Per pt request, faxed referral to office at [phone] as requested.   No further questions or concerns. Pt verbalized understanding and agreed with POC.

## 2025-03-27 ENCOUNTER — TELEPHONE (OUTPATIENT)
Dept: PHYSICAL THERAPY | Facility: HOSPITAL | Age: 70
End: 2025-03-27

## 2025-04-01 ENCOUNTER — OFFICE VISIT (OUTPATIENT)
Dept: PHYSICAL THERAPY | Facility: HOSPITAL | Age: 70
End: 2025-04-01
Attending: STUDENT IN AN ORGANIZED HEALTH CARE EDUCATION/TRAINING PROGRAM
Payer: MEDICARE

## 2025-04-01 DIAGNOSIS — M17.12 PRIMARY OSTEOARTHRITIS OF LEFT KNEE: Primary | ICD-10-CM

## 2025-04-01 DIAGNOSIS — M17.11 PRIMARY OSTEOARTHRITIS OF RIGHT KNEE: ICD-10-CM

## 2025-04-01 PROCEDURE — 97110 THERAPEUTIC EXERCISES: CPT

## 2025-04-01 PROCEDURE — 97161 PT EVAL LOW COMPLEX 20 MIN: CPT

## 2025-04-02 DIAGNOSIS — E78.00 PURE HYPERCHOLESTEROLEMIA: ICD-10-CM

## 2025-04-02 NOTE — PROGRESS NOTES
LOWER EXTREMITY EVALUATION:     Diagnosis:   Primary osteoarthritis of left knee (M17.12)  Primary osteoarthritis of right knee (M17.11) Patient:  Gloria Naman Paz (70 year old, female)        Referring Provider: Saad Newman  Today's Date   4/2/2025    Precautions:  None   Date of Evaluation: 04/01/25  Next MD visit: as neeeded  Date of Surgery: No data recorded     PATIENT SUMMARY   Summary of chief complaints: Bilateral knee pain  History of current condition: Knee pain has been going on for a year. L knee is a little more painful than R. She has been pretty active recently and the knees have been a little more sore. Prior to knee pain starting, was walking 30 min/day. Now has to stop because of the pain, is able to walk for about 15-20 minutes. Rest releives knee pain quickly (within 5 min). Has not tried ice, but has tried some massage oil which helps. Knee pain does not wake her up in the night. Wears house shoes because her feet get cold and the shoes seem to help her knees as well. Typically has some pain in the morning first thing after getting out of bed, and gets better as the day goes on. Also has some back pain which seems to come on at the same time as knee pain.   Pain level: current 0 /10, at best 0 /10, at worst 2 /10  Description of symptoms: Not sharp, dull. Denies numbness/tingling.   Occupation: . Is able to alternate between standing/sitting   Leisure activities/Hobbies:     Prior level of function: Walking 30 min/day  Current limitations: Walking >30 min  Pt goals: Walking without pain  Past medical history was reviewed with Gloria.  Significant findings include:    Imaging/Tests: L knee Xray: CONCLUSION:  Moderate medial and patellofemoral compartment narrowing with hypertrophic spurring. R knee x ray: CONCLUSION:  Negative for fracture or malalignment.  Moderate/severe joint space loss and small osteophytes of the lateral compartment, similar in extent compared to 2022.  No  patellar subluxation.  No joint effusion.  Mineralized structure posteriorly   within the joint measures 1.4 x 0.9 x 1.7 cm, suspected intra-articular body, stable compared to 2022.   Gloria  has a past medical history of Eczema, Other and unspecified hyperlipidemia, and Unspecified essential hypertension.  She  has no past surgical history on file.    ASSESSMENT  Gloria presents to physical therapy evaluation with primary c/o Bilateral knee pain. The results of the objective tests and measures show decreased knee ext ROM bilaterally, decreased hip and knee strength bilaterally, impaired patellar glides bilaterally, decreased hip flexor length bilaterally.. Functional deficits include but are not limited to Walking >30 min. Signs and symptoms are consistent with diagnosis of Primary osteoarthritis of left knee (M17.12)  Primary osteoarthritis of right knee (M17.11). Pt and PT discussed evaluation findings, pathology, POC and HEP.  Pt voiced understanding and performs HEP correctly without reported pain. Skilled Physical Therapy is medically necessary to address the above impairments and reach functional goals.    OBJECTIVE:    Musculoskeletal  Palpation: no tenderness to palpation of joint line bilaterally   Accessory Motion: limited PF glides bilatearlly in all directions. Slight limitations to tib femoral anterior glides bilaterally     Edema: None present    Special Tests:    Knee Special Tests:  Varus Stress Test: R -, L -  Valgus Stress Test: R -, L -  Lachman Test: R -, L -  Posterior Drawer Test: R -, L -  Heike's Test: R -, L -    Squat: 1/2 depth pain free       ROM and Strength: (* denotes performed with pain)  Hip   ROM MMT (-/5)    R L R L     Flex (L2)     4+ 4+     Ext      4 4     Abd     4 4+     ER     4+ 4     IR     4+ 4    ,   Knee   ROM MMT (-/5)    R L R L     Flex 125 125 4 4     Ext (L3) -2 deg -4 deg 5 5     ,   Myotome MMT   MMT (-/5)    R L   Hip Flex (L2) 4+ 4+   Knee Ext (L3) 5 5    Ankle DF (L4) 5 5   Ankle PF (S1) 5 5   Grt Toe Ext (L5)           Flexibility:  LE Flexibility R L     Hip Flexor min restricted min restricted     Hamstrings min restricted min restricted     ITB WNL WNL     Piriformis         Quads         Gastroc-soleus           Neurological:  Sensation: SILT     Balance and Functional Mobility:  Gait: pt ambulates on level ground with decreased step length   Balance: SLS: R  ,  L  TBA at next therapy session     Today's Treatment and Response:   Pt education was provided on exam findings, treatment diagnosis, treatment plan, expectations, and prognosis.  Today's Treatment       4/1/2025   LE Treatment   Therapeutic Exercise Modified Jay Jay Stretch 3 x 20 sec R/L   Supine bridges 2 x 10 reps    Therapeutic Exercise Minutes 15   Evaluation Minutes 30   Total Time Of Timed Procedures 15   Total Time Of Service-Based Procedures 30   Total Treatment Time 45   HEP Access Code: [email or web address]  URL: [email or web address]  Date: 04/01/2025  Prepared by: Peri Cheng    Exercises  - Supine Bridge  - 1 x daily - 7 x weekly - 3 sets - 10 reps  - Modified Jay Jay Stretch  - 1 x daily - 7 x weekly - 1 sets - 3 reps - 20 hold        Patient was instructed in and issued a HEP for: Access Code: [email or web address]  URL: [email or web address]  Date: 04/01/2025  Prepared by: Peri Cheng    Exercises  - Supine Bridge  - 1 x daily - 7 x weekly - 3 sets - 10 reps  - Modified Jay Jay Stretch  - 1 x daily - 7 x weekly - 1 sets - 3 reps - 20 hold    Charges:  PT EVAL: Low Complexity, 1 TE  In agreement with evaluation findings and clinical rationale, this evaluation involved LOW COMPLEXITY decision making due to no personal factors/comorbidities, 1-2 body structures involved/activity limitations, and stable symptoms as documented in the evaluation.                                                                                                                PLAN OF CARE:     Goals: (to be met in 10 visits)    Not Met Progress Toward Partially Met Met   Pt will improve knee extension ROM to 0 deg to allow proper heel strike during gait and terminal knee extension in stance. [] [] [] []   Pt will improve quad strength to 5/5 to ascend 1 flight of stairs reciprocally without UE assist. [] [] [] []   Pt will increase hip and knee strength to grossly 4+/5 to be able to get up and down from the floor safely. [] [] [] []   Pt will demonstrate increased hip ER/ABD strength to 5/5 to perform stepping and squatting activities without excessive femoral IR/ADD. [] [] [] []   Pt will improve SLS to 15s to improve safety with gait on uneven surfaces such as grass and gravel. [] [] [] []   Pt will be independent and compliant with comprehensive HEP to maintain progress achieved in PT. [] [] [] []          Frequency / Duration: Patient will be seen 1-2x/week or a total of 10  visits over a 90 day period. Treatment will include: Manual Therapy; Gait training; Neuromuscular Re-education; Therapeutic Activities; Therapeutic Exercise; Home Exercise Program instruction    Education or treatment limitation: None   Rehab Potential: good     LEFS Score  LEFS Score: (Patient-Rptd) 76.25 % (4/1/2025  2:28 PM)      Patient/Family/Caregiver was advised of these findings, precautions, and treatment options and has agreed to actively participate in planning and for this course of care.    Thank you for your referral. Please co-sign or sign and return this letter via fax as soon as possible to [phone]. If you have any questions, please contact me at Dept: [phone]    Sincerely,  Electronically signed by therapist: Peri Cheng, PT  Physician's certification required: Yes  I certify the need for these services furnished under this plan of treatment and while under my care.    X___________________________________________________ Date____________________    Certification From: 4/2/2025  To:7/1/2025

## 2025-04-03 ENCOUNTER — APPOINTMENT (OUTPATIENT)
Dept: PHYSICAL THERAPY | Facility: HOSPITAL | Age: 70
End: 2025-04-03
Attending: STUDENT IN AN ORGANIZED HEALTH CARE EDUCATION/TRAINING PROGRAM
Payer: MEDICARE

## 2025-04-04 NOTE — TELEPHONE ENCOUNTER
rosuvastatin 10 MG Oral Tab 90 tablet 1 2/19/2025 --    Sig - Route: Take 1 tablet (10 mg total) by mouth nightly. - Oral    Sent to pharmacy as: Rosuvastatin Calcium 10 MG Oral Tablet (Crestor)    E-Prescribing Status: Receipt confirmed by pharmacy (2/19/2025 12:47 PM CST)      Associated Diagnoses    Pure hypercholesterolemia        Pharmacy    [id number] [address], [phone], [phone]

## 2025-04-07 RX ORDER — ROSUVASTATIN CALCIUM 10 MG/1
10 TABLET, COATED ORAL NIGHTLY
Qty: 90 TABLET | Refills: 3 | OUTPATIENT
Start: 2025-04-07

## 2025-04-08 ENCOUNTER — OFFICE VISIT (OUTPATIENT)
Dept: PHYSICAL THERAPY | Facility: HOSPITAL | Age: 70
End: 2025-04-08
Attending: STUDENT IN AN ORGANIZED HEALTH CARE EDUCATION/TRAINING PROGRAM
Payer: MEDICARE

## 2025-04-08 PROCEDURE — 97140 MANUAL THERAPY 1/> REGIONS: CPT

## 2025-04-08 PROCEDURE — 97110 THERAPEUTIC EXERCISES: CPT

## 2025-04-08 NOTE — PROGRESS NOTES
Patient: lGoria Paz (70 year old, female) Referring Provider:  Insurance:   Diagnosis: Primary osteoarthritis of left knee (M17.12)  Primary osteoarthritis of right knee (M17.11) Saad LOWRY   Date of Surgery: No data recorded Next MD visit:  N/A   Precautions:  None as neeeded Referral Information:    Date of Evaluation: Req: 8, Auth: 8, Exp: 6/1/2025 04/01/25 POC Auth Visits:          Today's Date   4/8/2025    Subjective  There was a lot of moving around at work today and the knees are a little sore. Has not had a chance to try the exercises at home yet.       Pain: 2/10     Objective  Knee ext ROM: -3 deg on R -1 on L            Assessment  Pt tolerated progression of strengthening well, reporting appropriate muscle fatigue without any knee pain. Continues to lack a few deg of knee ext ROM bilaterally, will continue to address. HEP reviewed and demonstrated good understanding.    Goals (to be met in 10 visits)    Not Met Progress Toward Partially Met Met   Pt will improve knee extension ROM to 0 deg to allow proper heel strike during gait and terminal knee extension in stance. [] [] [] []   Pt will improve quad strength to 5/5 to ascend 1 flight of stairs reciprocally without UE assist. [] [] [] []   Pt will increase hip and knee strength to grossly 4+/5 to be able to get up and down from the floor safely. [] [] [] []   Pt will demonstrate increased hip ER/ABD strength to 5/5 to perform stepping and squatting activities without excessive femoral IR/ADD. [] [] [] []   Pt will improve SLS to 15s to improve safety with gait on uneven surfaces such as grass and gravel. [] [] [] []   Pt will be independent and compliant with comprehensive HEP to maintain progress achieved in PT. [] [] [] []              Plan  Continue with POC for LE strengthening and manual therapy as needed.    Treatment Last 4 Visits  Treatment Day: 2       4/1/2025 4/8/2025   LE Treatment   Therapeutic Exercise Modified  Jay Jay Stretch 3 x 20 sec R/L   Supine bridges 2 x 10 reps  Quad sets 2 x 10 R/L   Supine bridges 2 x 10   Hooklying clamshells GTB 2 x 12 reps   Hooklying hip adduction with ball x 20 reps   Shuttle DLP 37# x 20 reps    Manual Therapy  Patellar mobs gr III all directions R/L   Tib femoral ant glides gr III 3 x 10 R/L    Therapeutic Exercise Minutes 15 25   Manual Therapy Minutes  15   Evaluation Minutes 30    Total Time Of Timed Procedures 15 40   Total Time Of Service-Based Procedures 30 0   Total Treatment Time 45 40   HEP Access Code: [email or web address]  URL: [email or web address]  Date: 04/01/2025  Prepared by: Peri Cheng    Exercises  - Supine Bridge  - 1 x daily - 7 x weekly - 3 sets - 10 reps  - Modified Jay Jay Stretch  - 1 x daily - 7 x weekly - 1 sets - 3 reps - 20 hold         HEP  Access Code: [email or web address]  URL: [email or web address]  Date: 04/01/2025  Prepared by: Peri Cheng    Exercises  - Supine Bridge  - 1 x daily - 7 x weekly - 3 sets - 10 reps  - Modified Jay Jay Stretch  - 1 x daily - 7 x weekly - 1 sets - 3 reps - 20 hold    Charges     1 Man, 2 TE

## 2025-04-10 ENCOUNTER — APPOINTMENT (OUTPATIENT)
Dept: PHYSICAL THERAPY | Facility: HOSPITAL | Age: 70
End: 2025-04-10
Attending: STUDENT IN AN ORGANIZED HEALTH CARE EDUCATION/TRAINING PROGRAM
Payer: MEDICARE

## 2025-04-15 ENCOUNTER — OFFICE VISIT (OUTPATIENT)
Dept: PHYSICAL THERAPY | Facility: HOSPITAL | Age: 70
End: 2025-04-15
Attending: STUDENT IN AN ORGANIZED HEALTH CARE EDUCATION/TRAINING PROGRAM
Payer: MEDICARE

## 2025-04-15 PROCEDURE — 97110 THERAPEUTIC EXERCISES: CPT

## 2025-04-15 PROCEDURE — 97140 MANUAL THERAPY 1/> REGIONS: CPT

## 2025-04-15 NOTE — PROGRESS NOTES
Patient: Gloria Paz (70 year old, female) Referring Provider:  Insurance:   Diagnosis: Primary osteoarthritis of left knee (M17.12)  Primary osteoarthritis of right knee (M17.11) Saad LOWRY   Date of Surgery: No data recorded Next MD visit:  N/A   Precautions:  None as neeeded Referral Information:    Date of Evaluation: Req: 8, Auth: 8, Exp: 6/1/2025 04/01/25 POC Auth Visits:          Today's Date   4/15/2025    Subjective  Reports she felt good after last time. The knees have been feeling pretty good lately. Her back feels sore after work today.       Pain: 0/10     Objective  L knee ext ROM: -2 deg, R: -1 deg            Assessment  Pt demonstrates improvements in knee ext ROM today, however continuing to lack a few deg of knee ext bilaterally. Tolerated progression of strengthening well reporting no increase in pain. Reports improved symptoms after session today. HEP updated to include hooklying clamshells.    Goals (to be met in 10 visits)      Not Met Progress Toward Partially Met Met   Pt will improve knee extension ROM to 0 deg to allow proper heel strike during gait and terminal knee extension in stance. [] [] [] []   Pt will improve quad strength to 5/5 to ascend 1 flight of stairs reciprocally without UE assist. [] [] [] []   Pt will increase hip and knee strength to grossly 4+/5 to be able to get up and down from the floor safely. [] [] [] []   Pt will demonstrate increased hip ER/ABD strength to 5/5 to perform stepping and squatting activities without excessive femoral IR/ADD. [] [] [] []   Pt will improve SLS to 15s to improve safety with gait on uneven surfaces such as grass and gravel. [] [] [] []   Pt will be independent and compliant with comprehensive HEP to maintain progress achieved in PT. [] [] [] []                  Plan  Continue with POC for LE strengthening and manual therapy as needed. Plan for next therapy session: step ups    Treatment Last 4 Visits  Treatment Day:  3       4/1/2025 4/8/2025 4/15/2025   LE Treatment   Therapeutic Exercise Modified Jay Jay Stretch 3 x 20 sec R/L   Supine bridges 2 x 10 reps  Quad sets 2 x 10 R/L   Supine bridges 2 x 10   Hooklying clamshells GTB 2 x 12 reps   Hooklying hip adduction with ball x 20 reps   Shuttle DLP 37# x 20 reps  Nustep lvl 3 x 5 min   SB DKTC for knee flexion x15  SLR x 12 R/L   Quad sets x 10 R/L   SB bridges 2 x 10   Hooklying clamshells BTB x 20   Seated hamstring curls BTB x 12 R/L    Manual Therapy  Patellar mobs gr III all directions R/L   Tib femoral ant glides gr III 3 x 10 R/L  Patellar mobs gr III all directions R/L  Tib femoral ant glides gr III 3 x 10 R/L   Tib femoral posterior glides gr III 3 x 10 R/L        Therapeutic Exercise Minutes 15 25 25   Manual Therapy Minutes  15 20   Evaluation Minutes 30     Total Time Of Timed Procedures 15 40 45   Total Time Of Service-Based Procedures 30 0 0   Total Treatment Time 45 40 45   HEP Access Code: [email or web address]  URL: [email or web address]  Date: 04/01/2025  Prepared by: Peri Cheng    Exercises  - Supine Bridge  - 1 x daily - 7 x weekly - 3 sets - 10 reps  - Modified Jay Jay Stretch  - 1 x daily - 7 x weekly - 1 sets - 3 reps - 20 hold  Access Code: [email or web address]  URL: [email or web address]  Date: 04/15/2025  Prepared by: Peri Cheng    Exercises  - Supine Bridge  - 1 x daily - 7 x weekly - 3 sets - 10 reps  - Modified Jay Jay Stretch  - 1 x daily - 7 x weekly - 1 sets - 3 reps - 20 hold  - Hooklying Clamshell with Resistance  - 1 x daily - 7 x weekly - 3 sets - 10 reps        HEP  Access Code: [email or web address]  URL: [email or web address]  Date: 04/15/2025  Prepared by: Peri Sisler    Exercises  - Supine Bridge  - 1 x daily - 7 x weekly - 3 sets - 10 reps  - Modified Jay Jay Stretch  - 1 x daily - 7 x weekly - 1 sets - 3 reps - 20 hold  - Hooklying Clamshell with Resistance  - 1 x daily - 7 x weekly - 3 sets - 10  reps    Charges     1 Man, 2 TE

## 2025-04-17 ENCOUNTER — OFFICE VISIT (OUTPATIENT)
Dept: PHYSICAL THERAPY | Facility: HOSPITAL | Age: 70
End: 2025-04-17
Attending: STUDENT IN AN ORGANIZED HEALTH CARE EDUCATION/TRAINING PROGRAM
Payer: MEDICARE

## 2025-04-17 PROCEDURE — 97140 MANUAL THERAPY 1/> REGIONS: CPT

## 2025-04-17 PROCEDURE — 97110 THERAPEUTIC EXERCISES: CPT

## 2025-04-17 NOTE — PROGRESS NOTES
Patient: Gloria Paz (70 year old, female) Referring Provider:  Insurance:   Diagnosis: Primary osteoarthritis of left knee (M17.12)  Primary osteoarthritis of right knee (M17.11) Saad LOWRY   Date of Surgery: No data recorded Next MD visit:  N/A   Precautions:  None as neeeded Referral Information:    Date of Evaluation: Req: 8, Auth: 8, Exp: 6/1/2025 04/01/25 POC Auth Visits:          Today's Date   4/17/2025    Subjective  There was a little pain in the knee on L yesterday but otherwise they have been feeling good.       Pain: 0/10     Objective  L knee ext: -2 deg, R knee ext: -1 deg            Assessment  Pt overall reporting improved symptoms since starting PT. Stair assessment performed today with patient performing most difficulty with reciprocal descent. Incorporated 2in step downs and able to complete pain free. Will continue to progress. Pt asking about treadmill walking. Advised to try no more than 10 min at a time to assess tolerance.    Goals (to be met in 10 visits)      Not Met Progress Toward Partially Met Met   Pt will improve knee extension ROM to 0 deg to allow proper heel strike during gait and terminal knee extension in stance. [] [x] [] []   Pt will improve quad strength to 5/5 to ascend 1 flight of stairs reciprocally without UE assist. [] [x] [] []   Pt will increase hip and knee strength to grossly 4+/5 to be able to get up and down from the floor safely. [] [] [] []   Pt will demonstrate increased hip ER/ABD strength to 5/5 to perform stepping and squatting activities without excessive femoral IR/ADD. [] [x] [] []   Pt will improve SLS to 15s to improve safety with gait on uneven surfaces such as grass and gravel. [] [] [] []   Pt will be independent and compliant with comprehensive HEP to maintain progress achieved in PT. [] [] [x] []                      Plan  Continue with POC for LE strengthening and manual therapy as needed. Plan for next therapy session: assess  tolerance to treadmill walking    Treatment Last 4 Visits  Treatment Day: 4       4/1/2025 4/8/2025 4/15/2025 4/17/2025   LE Treatment   Therapeutic Exercise Modified Jay Jay Stretch 3 x 20 sec R/L   Supine bridges 2 x 10 reps  Quad sets 2 x 10 R/L   Supine bridges 2 x 10   Hooklying clamshells GTB 2 x 12 reps   Hooklying hip adduction with ball x 20 reps   Shuttle DLP 37# x 20 reps  Nustep lvl 3 x 5 min   SB DKTC for knee flexion x15  SLR x 12 R/L   Quad sets x 10 R/L   SB bridges 2 x 10   Hooklying clamshells BTB x 20   Seated hamstring curls BTB x 12 R/L  Nustep lvl 4 x 5 min   SLR x 12 R/L  Hooklying hip adduction with ball x 20 reps  Shuttle DLP 50#   Step ups 6 in step x 10 R/L   Stair assessment: able to complete reciprocal ascent pain free, step to for descent due to pain  2 in step downs with focus on eccentric control  2 x 10 R/L    Manual Therapy  Patellar mobs gr III all directions R/L   Tib femoral ant glides gr III 3 x 10 R/L  Patellar mobs gr III all directions R/L  Tib femoral ant glides gr III 3 x 10 R/L   Tib femoral posterior glides gr III 3 x 10 R/L      Tib femoral ant glides gr III 3 x 10 R/L   Therapeutic Exercise Minutes 15 25 25 30   Manual Therapy Minutes  15 20 10   Evaluation Minutes 30      Total Time Of Timed Procedures 15 40 45 40   Total Time Of Service-Based Procedures 30 0 0 0   Total Treatment Time 45 40 45 40   HEP Access Code: [email or web address]  URL: [email or web address]"/  Date: 04/01/2025  Prepared by: Peri Cheng    Exercises  - Supine Bridge  - 1 x daily - 7 x weekly - 3 sets - 10 reps  - Modified Jay Jay Stretch  - 1 x daily - 7 x weekly - 1 sets - 3 reps - 20 hold  Access Code: [email or web address]  URL: [email or web address]://"[email or web address]"/  Date: 04/15/2025  Prepared by: Peri Cheng    Exercises  - Supine Bridge  - 1 x daily - 7 x weekly - 3 sets - 10 reps  - Modified Jay Jay Stretch  - 1 x daily - 7 x weekly - 1 sets - 3 reps - 20 hold  - Hooklying Elvira with  Resistance  - 1 x daily - 7 x weekly - 3 sets - 10 reps         HEP  Access Code: [email or web address]  URL: [email or web address]  Date: 04/15/2025  Prepared by: Peri Cheng    Exercises  - Supine Bridge  - 1 x daily - 7 x weekly - 3 sets - 10 reps  - Modified Jay Jay Stretch  - 1 x daily - 7 x weekly - 1 sets - 3 reps - 20 hold  - Hooklying Clamshell with Resistance  - 1 x daily - 7 x weekly - 3 sets - 10 reps    Charges     1 Man, 2 TE

## 2025-04-24 DIAGNOSIS — I10 ESSENTIAL HYPERTENSION: ICD-10-CM

## 2025-04-28 RX ORDER — LOSARTAN POTASSIUM AND HYDROCHLOROTHIAZIDE 12.5; 5 MG/1; MG/1
1 TABLET ORAL DAILY
Qty: 90 TABLET | Refills: 3 | Status: SHIPPED | OUTPATIENT
Start: 2025-04-28

## 2025-04-28 NOTE — TELEPHONE ENCOUNTER
Refill Per Protocol     Requested Prescriptions   Pending Prescriptions Disp Refills    LOSARTAN-HYDROCHLOROTHIAZIDE 50-12.5 MG Oral Tab [Pharmacy Med Name: Losartan Potassium-HCTZ Oral Tablet 50-12.5 MG] 90 tablet 3     Sig: TAKE 1 TABLET EVERY DAY       Hypertension Medications Protocol Passed - 4/28/2025  2:19 PM        Passed - CMP or BMP in past 12 months        Passed - Last BP reading less than 140/90     BP Readings from Last 1 Encounters:   02/19/25 120/82               Passed - In person appointment or virtual visit in the past 12 mos or appointment in next 3 mos     Recent Outpatient Visits              1 week ago     [facility] Peri Cheng PT    Office Visit    1 week ago     [facility] Peri Cheng PT    Office Visit    2 weeks ago     [facility] Peri Cheng PT    Office Visit    3 weeks ago Primary osteoarthritis of left knee    [facility] Peri Cheng, PT    Office Visit    1 month ago Primary osteoarthritis of left knee    [facility], [address] [place] [address] Saad Newman MD    Office Visit          Future Appointments         Provider Department Appt Notes    In 1 week Peri Cheng PT [facility] 8 visits auth 4/1 to 6/1  [employer]  $25 c/p    In 3 weeks Peri Cheng PT [facility] 8 visits auth 4/1 to 6/1  [employer]  $25 c/p    In 3 months Nava Guillory MD [facility], [address] [place] [address] As per patient follow-up                    Passed - EGFRCR or GFRNAA > 50     GFR Evaluation  EGFRCR: 62 , resulted on 2/5/2025          Passed - Medication is active on med list

## 2025-05-08 ENCOUNTER — OFFICE VISIT (OUTPATIENT)
Dept: PHYSICAL THERAPY | Facility: HOSPITAL | Age: 70
End: 2025-05-08
Attending: FAMILY MEDICINE
Payer: MEDICARE

## 2025-05-08 PROCEDURE — 97110 THERAPEUTIC EXERCISES: CPT

## 2025-05-08 PROCEDURE — 97140 MANUAL THERAPY 1/> REGIONS: CPT

## 2025-05-08 NOTE — PROGRESS NOTES
Patient: Gloria Paz (70 year old, female) Referring Provider:  Insurance:   Diagnosis: Primary osteoarthritis of left knee (M17.12)  Primary osteoarthritis of right knee (M17.11) Saad LOWRY   Date of Surgery: No data recorded Next MD visit:  N/A   Precautions:  None as neeeded Referral Information:    Date of Evaluation: Req: 8, Auth: 8, Exp: 6/1/2025 04/01/25 POC Auth Visits:          Today's Date   5/8/2025    Subjective  The knees have been feeling good. Has been doing HEP consistently. Wants to start walking outside now that the weather is better. States she is 90% better since starting therapy. Would like to keep final visit before going on trip for 3 weeks.       Pain: 0/10     Objective  R knee ext: -1 deg L knee ext: -1 deg              Assessment  Pt continues to report improved symptoms since starting PT. Today able to tolerate 4 in step downs with minimal pain. Encouraged to continue wit walking program at home and HEP to continue building strength. Discussed next visit being final PT visit as patient is leaving [facility] for a month.    Goals (to be met in 10 visits)      Not Met Progress Toward Partially Met Met   Pt will improve knee extension ROM to 0 deg to allow proper heel strike during gait and terminal knee extension in stance. [] [x] [] []   Pt will improve quad strength to 5/5 to ascend 1 flight of stairs reciprocally without UE assist. [] [x] [] []   Pt will increase hip and knee strength to grossly 4+/5 to be able to get up and down from the floor safely. [] [] [x] []   Pt will demonstrate increased hip ER/ABD strength to 5/5 to perform stepping and squatting activities without excessive femoral IR/ADD. [] [] [x] []   Pt will improve SLS to 15s to improve safety with gait on uneven surfaces such as grass and gravel. [] [] [] [x]   Pt will be independent and compliant with comprehensive HEP to maintain progress achieved in PT. [] [] [x] []                           Plan  Continue with POC for LE strengthening and manual therapy as needed. Plan for next therapy session: discharge to [facility] HEP    Treatment Last 4 Visits  Treatment Day: 5       4/8/2025 4/15/2025 4/17/2025 5/8/2025   LE Treatment   Therapeutic Exercise Quad sets 2 x 10 R/L   Supine bridges 2 x 10   Hooklying clamshells GTB 2 x 12 reps   Hooklying hip adduction with ball x 20 reps   Shuttle DLP 37# x 20 reps  Nustep lvl 3 x 5 min   SB DKTC for knee flexion x15  SLR x 12 R/L   Quad sets x 10 R/L   SB bridges 2 x 10   Hooklying clamshells BTB x 20   Seated hamstring curls BTB x 12 R/L  Nustep lvl 4 x 5 min   SLR x 12 R/L  Hooklying hip adduction with ball x 20 reps  Shuttle DLP 50#   Step ups 6 in step x 10 R/L   Stair assessment: able to complete reciprocal ascent pain free, step to for descent due to pain  2 in step downs with focus on eccentric control  2 x 10 R/L  SLR x 12 R/L   Shuttle DLP 50# x 20   Shuttle SLP 25# x 15 ea   RTB lateral walks x 2 laps in // bars   RTB fwd/bkwd monster walks x 2 laps in // bars   Step downs 4 in x 15 oxana     SLS 15 sec oxana    Manual Therapy Patellar mobs gr III all directions R/L   Tib femoral ant glides gr III 3 x 10 R/L  Patellar mobs gr III all directions R/L  Tib femoral ant glides gr III 3 x 10 R/L   Tib femoral posterior glides gr III 3 x 10 R/L      Tib femoral ant glides gr III 3 x 10 R/L Tib femoral ant glides gr III 3 x 10 R/L   Therapeutic Exercise Minutes 25 25 30 30   Manual Therapy Minutes 15 20 10 10   Total Time Of Timed Procedures 40 45 40 40   Total Time Of Service-Based Procedures 0 0 0 0   Total Treatment Time 40 45 40 40   HEP  Access Code: [email or web address]  URL: [email or web address]  Date: 04/15/2025  Prepared by: Peri Cheng    Exercises  - Supine Bridge  - 1 x daily - 7 x weekly - 3 sets - 10 reps  - Modified Jay Jay Stretch  - 1 x daily - 7 x weekly - 1 sets - 3 reps - 20 hold  - Hooklying Clamshell with Resistance  - 1 x daily - 7 x weekly  - 3 sets - 10 reps          HEP  Access Code: [email or web address]  URL: [email or web address]  Date: 04/15/2025  Prepared by: Peri Cheng    Exercises  - Supine Bridge  - 1 x daily - 7 x weekly - 3 sets - 10 reps  - Modified Jay Jay Stretch  - 1 x daily - 7 x weekly - 1 sets - 3 reps - 20 hold  - Hooklying Clamshell with Resistance  - 1 x daily - 7 x weekly - 3 sets - 10 reps    Charges     1 Man, 2 TE

## 2025-05-13 ENCOUNTER — APPOINTMENT (OUTPATIENT)
Dept: PHYSICAL THERAPY | Facility: HOSPITAL | Age: 70
End: 2025-05-13
Payer: MEDICARE

## 2025-05-22 ENCOUNTER — APPOINTMENT (OUTPATIENT)
Dept: PHYSICAL THERAPY | Facility: HOSPITAL | Age: 70
End: 2025-05-22
Attending: STUDENT IN AN ORGANIZED HEALTH CARE EDUCATION/TRAINING PROGRAM
Payer: MEDICARE

## 2025-06-11 ENCOUNTER — TELEPHONE (OUTPATIENT)
Dept: FAMILY MEDICINE CLINIC | Facility: CLINIC | Age: 70
End: 2025-06-11

## 2025-06-11 NOTE — TELEPHONE ENCOUNTER
Unable to reach patient for medication adherence consult. LVM for patient to call me back at [phone].

## 2025-06-20 DIAGNOSIS — R00.2 PALPITATIONS: ICD-10-CM

## 2025-06-20 DIAGNOSIS — I10 ESSENTIAL HYPERTENSION: ICD-10-CM

## 2025-06-23 ENCOUNTER — NURSE TRIAGE (OUTPATIENT)
Dept: FAMILY MEDICINE CLINIC | Facility: CLINIC | Age: 70
End: 2025-06-23

## 2025-06-23 ENCOUNTER — OFFICE VISIT (OUTPATIENT)
Dept: FAMILY MEDICINE CLINIC | Facility: CLINIC | Age: 70
End: 2025-06-23
Payer: MEDICARE

## 2025-06-23 DIAGNOSIS — T16.1XXA FOREIGN BODY IN AURICLE OF RIGHT EAR, INITIAL ENCOUNTER: Primary | ICD-10-CM

## 2025-06-23 PROCEDURE — 69200 CLEAR OUTER EAR CANAL: CPT | Performed by: NURSE PRACTITIONER

## 2025-06-23 PROCEDURE — 1160F RVW MEDS BY RX/DR IN RCRD: CPT | Performed by: NURSE PRACTITIONER

## 2025-06-23 PROCEDURE — 1159F MED LIST DOCD IN RCRD: CPT | Performed by: NURSE PRACTITIONER

## 2025-06-23 RX ORDER — METOPROLOL TARTRATE 25 MG/1
25 TABLET, FILM COATED ORAL DAILY
Qty: 90 TABLET | Refills: 3 | Status: SHIPPED | OUTPATIENT
Start: 2025-06-23

## 2025-06-23 NOTE — TELEPHONE ENCOUNTER
Action Requested: Summary for Provider     []  Critical Lab, Recommendations Needed  [] Need Additional Advice  []   FYI    []   Need Orders  [] Need Medications Sent to Pharmacy  []  Other     SUMMARY: Patient called stating that she was using a q-tip in ear and cotton ball stayed in ear. Patient states that she can not take it out herself. Advised if she had any family to look in ear and states she has no one to help. Patient denies any pain. Patient would like to be seen today, advised that  does have not have any openings today but patient can go to walk in clinic for assistance. Patient states that she doesn't want to go to walk in clinic. Patient agreed for appointment tomorrow. Appointment made 6/24/25. UC precautions given, patient verbalizes understanding.     Reason for call: Ear Problem Pain  Onset: 6/23/25                     Reason for Disposition   Patient wants to be seen    Protocols used: Ear - Foreign Body-A-[place]

## 2025-06-23 NOTE — PATIENT INSTRUCTIONS
Avoid objects in ear. Use warm water from the shower and the edge of a towel or hairdryer on \"cool\" setting to dry ear canals.  Follow up with Dr. Guillory if worsening symptoms.

## 2025-06-23 NOTE — PROGRESS NOTES
Patient here for removal of cotton swab from right ear canal. Patient was cleaning ears after shower today and retained the end of the cotton swab in her canal.   Past Medical History[1]  Past Surgical History[2]  Medications Ordered Prior to Encounter[3]  There were no vitals taken for this visit.      Cotton swab noted blocking view of TM. Removed easily with forceps. Advised on ear cleaning at home and avoiding objects in ear. Patient tolerated procedure well and verbalized understanding.       [1]   Past Medical History:   Eczema    Other and unspecified hyperlipidemia    Unspecified essential hypertension   [2] No past surgical history on file.  [3]   Current Outpatient Medications on File Prior to Visit   Medication Sig Dispense Refill    losartan-hydroCHLOROthiazide 50-12.5 MG Oral Tab Take 1 tablet by mouth daily. 90 tablet 3    amLODIPine 5 MG Oral Tab Take 1 tablet (5 mg total) by mouth daily. 90 tablet 1    metoprolol tartrate 25 MG Oral Tab Take 1 tablet (25 mg total) by mouth daily. 90 tablet 1    rosuvastatin 10 MG Oral Tab Take 1 tablet (10 mg total) by mouth nightly. 90 tablet 1    cetirizine 10 MG Oral Tab Take 1 tablet (10 mg total) by mouth daily. 90 tablet 3    azithromycin 500 MG Oral Tab Take 1 tablet (500 mg total) by mouth daily. (Patient not taking: Reported on 3/6/2025) 5 tablet 0    Meloxicam 7.5 MG Oral Tab Take 1 tablet (7.5 mg total) by mouth daily. (Patient not taking: Reported on 3/6/2025) 30 tablet 0    fluticasone propionate 50 MCG/ACT Nasal Suspension 1 spray by Nasal route daily. One spray per each nostril daily. (Patient not taking: Reported on 3/6/2025) 16 g 1    albuterol (PROAIR HFA) 108 (90 Base) MCG/ACT Inhalation Aero Soln 1-2 puffs every 4-6 hours during the day for 2 days then as needed. (Patient not taking: Reported on 3/6/2025) 1 each 0    Calcium Carb-Cholecalciferol (OYSTER SHELL CALCIUM) 500-400 MG-UNIT Oral Tab Take 1 tablet by mouth daily.      aspirin 81 MG Oral  Tab EC Take 1 tablet (81 mg total) by mouth daily.       No current facility-administered medications on file prior to visit.

## 2025-06-26 ENCOUNTER — OFFICE VISIT (OUTPATIENT)
Dept: PHYSICAL THERAPY | Facility: HOSPITAL | Age: 70
End: 2025-06-26
Attending: FAMILY MEDICINE
Payer: MEDICARE

## 2025-06-26 PROCEDURE — 97110 THERAPEUTIC EXERCISES: CPT

## 2025-06-26 NOTE — PROGRESS NOTES
Patient: Gloria Paz (70 year old, female) Referring Provider:  Insurance:   Diagnosis: Primary osteoarthritis of left knee (M17.12)  Primary osteoarthritis of right knee (M17.11) Saad LOWRY   Date of Surgery: No data recorded Next MD visit:  N/A   Precautions:  None as neeeded Referral Information:    Date of Evaluation: Req: 8, Auth: 8, Exp: 7/1/2025 04/01/25 POC Auth Visits:          Today's Date   6/26/2025     Discharge Summary  Pt has attended 6 visits in Physical Therapy.     Subjective  Overall has been feeling much better. Got more sore when on vacation because she was not able to consistently do the exercises. When she does the exercises consistently the pain stays away. Reports is ready for today to be the last day.       Pain: 0/10     Objective          Hip       4/1/2025 6/26/2025   Hip ROM/MMT   Rt Hip Flexion MMT (L2) 4+ 5   Lt Hip Flexion MMT (L2) 4+ 5   Rt Hip Extension MMT 4    Lt Hip Extension MMT 4    Rt Hip Abduction MMT 4 5   Lt Hip Abduction MMT 4+ 5   Rt Hip ER MMT 4+ 4+   Lt Hip ER MMT 4 4+   Rt Hip IR MMT 4+ 5   Lt Hip IR MMT 4 4+   , Knee       4/1/2025 6/26/2025   Knee ROM/MMT   Rt Knee Flexion 125 127   Lt Knee Flexion 125 127   Rt Knee Flexion MMT 4 4+   Lt Knee Flexion MMT 4 4+   Rt Knee Extension (L3) -2 deg -1 deg   Lt Knee Extension (L3) -4 deg 0 deg   Rt Knee Extension MMT (L3) 5    Lt Knee Extension MMT (L3) 5    , LE Flexibility       4/1/2025 6/26/2025   LE Flexibility   Rt Hip Flexor min restricted WNL   Lt Hip Flexor min restricted WNL   Rt Hamstring min restricted WNL   Lt Hamstring min restricted WNL   Rt ITB WNL    Lt ITB WNL            Assessment  Pt reports overall improved symptoms since starting physical therapy. Reassessment of ROM and strength demonstrates improvements in knee AROM and hip and knee strength. Reports much relief with HEP. Pt educated on importance of continuing with HEP to maintain strength changes. HEP reviewed, all questions  answered, pt demonstrates good understanding.    Goals (to be met in 10 visits)      Not Met Progress Toward Partially Met Met   Pt will improve knee extension ROM to 0 deg to allow proper heel strike during gait and terminal knee extension in stance. [] [] [] [x]   Pt will improve quad strength to 5/5 to ascend 1 flight of stairs reciprocally without UE assist. [] [] [] [x]   Pt will increase hip and knee strength to grossly 4+/5 to be able to get up and down from the floor safely. [] [] [] [x]   Pt will demonstrate increased hip ER/ABD strength to 5/5 to perform stepping and squatting activities without excessive femoral IR/ADD. [] [] [x] []   Pt will improve SLS to 15s to improve safety with gait on uneven surfaces such as grass and gravel. [] [] [] [x]   Pt will be independent and compliant with comprehensive HEP to maintain progress achieved in PT. [] [] [] [x]                              Plan  Discharge to independent HEP. If patient does not contact office within 4 weeks formally discharge from physical therapy.    Treatment Last 4 Visits  Treatment Day: 6       4/15/2025 4/17/2025 5/8/2025 6/26/2025   LE Treatment   Therapeutic Exercise Nustep lvl 3 x 5 min   SB DKTC for knee flexion x15  SLR x 12 R/L   Quad sets x 10 R/L   SB bridges 2 x 10   Hooklying clamshells BTB x 20   Seated hamstring curls BTB x 12 R/L  Nustep lvl 4 x 5 min   SLR x 12 R/L  Hooklying hip adduction with ball x 20 reps  Shuttle DLP 50#   Step ups 6 in step x 10 R/L   Stair assessment: able to complete reciprocal ascent pain free, step to for descent due to pain  2 in step downs with focus on eccentric control  2 x 10 R/L  SLR x 12 R/L   Shuttle DLP 50# x 20   Shuttle SLP 25# x 15 ea   RTB lateral walks x 2 laps in // bars   RTB fwd/bkwd monster walks x 2 laps in // bars   Step downs 4 in x 15 oxana     SLS 15 sec oxana  Reassessment as above   SLR x 10 R/L   Hooklying clamshell BTB x 20   Shuttle DLP 56# x 20   Step downs 6 in x 15 R/L      HEP reviewed, all questions answered   Manual Therapy Patellar mobs gr III all directions R/L  Tib femoral ant glides gr III 3 x 10 R/L   Tib femoral posterior glides gr III 3 x 10 R/L      Tib femoral ant glides gr III 3 x 10 R/L Tib femoral ant glides gr III 3 x 10 R/L    Therapeutic Exercise Minutes 25 30 30 27   Manual Therapy Minutes 20 10 10    Total Time Of Timed Procedures 45 40 40 27   Total Time Of Service-Based Procedures 0 0 0 0   Total Treatment Time 45 40 40 27   HEP Access Code: [email or web address]  URL: [email or web address]  Date: 04/15/2025  Prepared by: Peri Domínguezler    Exercises  - Supine Bridge  - 1 x daily - 7 x weekly - 3 sets - 10 reps  - Modified Jay Jay Stretch  - 1 x daily - 7 x weekly - 1 sets - 3 reps - 20 hold  - Hooklying Clamshell with Resistance  - 1 x daily - 7 x weekly - 3 sets - 10 reps   Access Code: [email or web address]  URL: [email or web address]  Date: 06/26/2025  Prepared by: Peri Sisler    Exercises  - Supine Bridge  - 1 x daily - 7 x weekly - 3 sets - 10 reps  - Modified Jay Jay Stretch  - 1 x daily - 7 x weekly - 1 sets - 3 reps - 20 hold  - Hooklying Clamshell with Resistance  - 1 x daily - 7 x weekly - 3 sets - 10 reps  - Supine Active Straight Leg Raise  - 1 x daily - 7 x weekly - 3 sets - 10 reps        HEP  Access Code: [email or web address]  URL: [email or web address]  Date: 06/26/2025  Prepared by: Peri Sisler    Exercises  - Supine Bridge  - 1 x daily - 7 x weekly - 3 sets - 10 reps  - Modified Jay Jay Stretch  - 1 x daily - 7 x weekly - 1 sets - 3 reps - 20 hold  - Hooklying Clamshell with Resistance  - 1 x daily - 7 x weekly - 3 sets - 10 reps  - Supine Active Straight Leg Raise  - 1 x daily - 7 x weekly - 3 sets - 10 reps    Charges     2 TE    LEFS Score  LEFS Score: (Patient-Rptd) 76.25 % (4/1/2025  2:28 PM)        Thank you for your referral. If you have any questions, please contact me at Dept:  [phone].    Sincerely,  Electronically signed by therapist: Peri Cheng PT     Physician's certification required:    Please co-sign or sign and return this letter via fax as soon as possible to [phone].   I certify the need for these services furnished under this plan of treatment and while under my care.    X___________________________________________________ Date____________________    Certification From: 6/26/2025  To:9/24/2025

## 2025-07-09 DIAGNOSIS — I10 ESSENTIAL HYPERTENSION: ICD-10-CM

## 2025-07-14 ENCOUNTER — HOSPITAL ENCOUNTER (OUTPATIENT)
Age: 70
Discharge: HOME | End: 2025-07-14
Attending: EMERGENCY MEDICINE
Payer: MEDICARE

## 2025-07-14 ENCOUNTER — APPOINTMENT (OUTPATIENT)
Dept: ULTRASOUND IMAGING | Age: 70
End: 2025-07-14
Attending: EMERGENCY MEDICINE
Payer: MEDICARE

## 2025-07-14 ENCOUNTER — NURSE TRIAGE (OUTPATIENT)
Dept: FAMILY MEDICINE CLINIC | Facility: CLINIC | Age: 70
End: 2025-07-14

## 2025-07-14 VITALS
HEART RATE: 63 BPM | BODY MASS INDEX: 26 KG/M2 | DIASTOLIC BLOOD PRESSURE: 50 MMHG | SYSTOLIC BLOOD PRESSURE: 163 MMHG | TEMPERATURE: 98 F | OXYGEN SATURATION: 97 % | RESPIRATION RATE: 18 BRPM | WEIGHT: 125 LBS

## 2025-07-14 DIAGNOSIS — M79.605 PAIN IN BOTH LOWER EXTREMITIES: Primary | ICD-10-CM

## 2025-07-14 DIAGNOSIS — M79.604 PAIN IN BOTH LOWER EXTREMITIES: Primary | ICD-10-CM

## 2025-07-14 PROCEDURE — 99214 OFFICE O/P EST MOD 30 MIN: CPT

## 2025-07-14 PROCEDURE — 99204 OFFICE O/P NEW MOD 45 MIN: CPT

## 2025-07-14 PROCEDURE — 93970 EXTREMITY STUDY: CPT | Performed by: EMERGENCY MEDICINE

## 2025-07-14 RX ORDER — AMLODIPINE BESYLATE 5 MG/1
5 TABLET ORAL DAILY
Qty: 90 TABLET | Refills: 3 | Status: SHIPPED | OUTPATIENT
Start: 2025-07-14

## 2025-07-14 NOTE — ED INITIAL ASSESSMENT (HPI)
Pt here c/o bilateral leg pain for last couple weeks.   Lower leg pain.   Pt did go to [place] and returned on 6/19 and having pain that started 4-5 days after her return flight..

## 2025-07-14 NOTE — TELEPHONE ENCOUNTER
Action Requested: Summary for Provider     []  Critical Lab, Recommendations Needed  [] Need Additional Advice  []   FYI    []   Need Orders  [] Need Medications Sent to Pharmacy  []  Other     SUMMARY: Received call from pt. Patient said she traveled for about 20 hrs 1 mo ago, since then she has been having pain in legs. Pain is bilateral, but worse in L. Patient did have swelling initially, but this has improved. Denies redness/warmth. Pain near calf. Given recent long distance travel and pain in calf, advised to be seen in UC today for eval. Location info given to pt, pt stated understanding and agreed to plan.     Reason for call: Leg Pain  Onset: 1 mo         Reason for Disposition   Long-distance travel in past month (e.g., car, bus, train, plane; with trip lasting 6 or more hours)    Protocols used: Leg Pain-A-[place]

## 2025-07-14 NOTE — ED PROVIDER NOTES
Patient Seen in: [facility]        History  Chief Complaint   Patient presents with    Leg Pain     Stated Complaint: Leg pain    Subjective:   HPI            70-year-old female stated that she did a long plane trip to [place] and back in June returning the third week in June and is having some ongoing bilateral leg pain, left greater than right.  There has also been some swelling of her ankles.  She has chronic sciatica which is typically relieved with exercises.      Objective:     Past Medical History:    Eczema    Other and unspecified hyperlipidemia    Unspecified essential hypertension              History reviewed. No pertinent surgical history.             No pertinent social history.            Review of Systems    Positive for stated complaint: Leg pain  Other systems are as noted in HPI.  Constitutional and vital signs reviewed.      All other systems reviewed and negative except as noted above.                  Physical Exam    ED Triage Vitals [07/14/25 1436]   BP (!) 163/50   Pulse 63   Resp 18   Temp 98.3 °F (36.8 °C)   Temp src Oral   SpO2 97 %   O2 Device None (Room air)       Current Vitals:   Vital Signs  BP: (!) 163/50  Pulse: 63  Resp: 18  Temp: 98.3 °F (36.8 °C)  Temp src: Oral    Oxygen Therapy  SpO2: 97 %  O2 Device: None (Room air)            Physical Exam     General Appearance: This is an older female sitting on a gurney.  Vital signs were reviewed per nurses notes.  HEENT: Normocephalic/atraumatic.  Anicteric sclera.  Oral mucosa is moist.  Skin: No rashes or lesions.  Extremities: No clubbing claudication or edema.  No cords or calf tenderness.  Peripheral pulses are present.  Capillary refill to the digits is brisk.      ED Course  Labs Reviewed - No data to display       US VENOUS DOPPLER LEG BILAT - DIAG IMG (CPT=93970)  Result Date: 7/14/2025  PROCEDURE: US VENOUS DOPPLER LEG BILAT - DIAG IMG (CPT=93970) INDICATIONS: Leg pain PATIENT STATED HISTORY: None  COMPARISON: No comparisons. TECHNIQUE:  Real time, grey scale, and duplex ultrasound was used to evaluate the lower extremity venous system. B-mode two-dimensional images of the vascular structures, Doppler spectral analysis, and color flow.  Doppler imaging were performed.  The following veins were imaged:  Common, deep, and superficial femoral, popliteal, sapheno-femoral junction, posterior tibial veins, and the contralateral common femoral vein. FINDINGS: SAPHENOFEMORAL JUNCTION: No reflux. THROMBI: None visible. COMPRESSION: Normal compressibility, phasicity, and augmentation. OTHER: Negative.     CONCLUSION: No evidence of DVT in the lower extremities Electronically Verified and Signed by Attending Radiologist: Twin Alfaro MD 7/14/2025 4:04 PM Workstation: [id number]    I personally reviewed the images myself and went over results with patient.    I viewed the venous Doppler films of the legs myself and there is no evidence of VTE.    Test results and treatment plan were discussed with the patient prior to discharge.                  MDM     #1.  Bilateral leg pain.  No evidence of DVT.  No appreciable leg weakness to suggest significant nerve root impingement.  Discharged home with PCP follow-up.  Over-the-counter analgesics for pain.        Medical Decision Making      Disposition and Plan     Clinical Impression:  1. Pain in both lower extremities         Disposition:  Discharge  7/14/2025  4:12 pm    Follow-up:  Nava Guillory MD  [address] [place] [id number]  [phone]    Call in 1 day            Medications Prescribed:  Discharge Medication List as of 7/14/2025  4:13 PM                Supplementary Documentation:

## 2025-08-06 ENCOUNTER — TELEPHONE (OUTPATIENT)
Dept: FAMILY MEDICINE CLINIC | Facility: CLINIC | Age: 70
End: 2025-08-06

## 2025-08-06 DIAGNOSIS — E78.00 PURE HYPERCHOLESTEROLEMIA: ICD-10-CM

## 2025-08-06 DIAGNOSIS — E11.65 TYPE 2 DIABETES MELLITUS WITH HYPERGLYCEMIA, WITHOUT LONG-TERM CURRENT USE OF INSULIN (HCC): Chronic | ICD-10-CM

## 2025-08-08 LAB
ALBUMIN/GLOBULIN RATIO: 1.4 (CALC) (ref 1–2.5)
ALBUMIN: 4.2 G/DL (ref 3.6–5.1)
ALKALINE PHOSPHATASE: 72 U/L (ref 37–153)
ALT: 23 U/L (ref 6–29)
AST: 22 U/L (ref 10–35)
BILIRUBIN, TOTAL: 0.4 MG/DL (ref 0.2–1.2)
BUN: 12 MG/DL (ref 7–25)
CALCIUM: 9.4 MG/DL (ref 8.6–10.4)
CARBON DIOXIDE: 26 MMOL/L (ref 20–32)
CHLORIDE: 100 MMOL/L (ref 98–110)
CHOL/HDLC RATIO: 2.8 (CALC)
CHOLESTEROL, TOTAL: 181 MG/DL
CREATININE: 1 MG/DL (ref 0.6–1)
EGFR: 60 ML/MIN/1.73M2
GLOBULIN: 2.9 G/DL (CALC) (ref 1.9–3.7)
GLUCOSE: 100 MG/DL (ref 65–99)
HDL CHOLESTEROL: 65 MG/DL
HEMOGLOBIN A1C: 6.2 %
LDL-CHOLESTEROL: 92 MG/DL (CALC)
NON-HDL CHOLESTEROL: 116 MG/DL (CALC)
POTASSIUM: 4.4 MMOL/L (ref 3.5–5.3)
PROTEIN, TOTAL: 7.1 G/DL (ref 6.1–8.1)
SODIUM: 135 MMOL/L (ref 135–146)
TRIGLYCERIDES: 143 MG/DL

## 2025-08-13 ENCOUNTER — OFFICE VISIT (OUTPATIENT)
Dept: FAMILY MEDICINE CLINIC | Facility: CLINIC | Age: 70
End: 2025-08-13

## 2025-08-13 VITALS
WEIGHT: 125 LBS | TEMPERATURE: 98 F | BODY MASS INDEX: 26.24 KG/M2 | OXYGEN SATURATION: 98 % | HEIGHT: 58 IN | HEART RATE: 82 BPM | RESPIRATION RATE: 18 BRPM | SYSTOLIC BLOOD PRESSURE: 120 MMHG | DIASTOLIC BLOOD PRESSURE: 78 MMHG

## 2025-08-13 DIAGNOSIS — D50.8 IRON DEFICIENCY ANEMIA SECONDARY TO INADEQUATE DIETARY IRON INTAKE: ICD-10-CM

## 2025-08-13 DIAGNOSIS — Z13.29 SCREENING FOR THYROID DISORDER: ICD-10-CM

## 2025-08-13 DIAGNOSIS — E11.9 DIET-CONTROLLED DIABETES MELLITUS (HCC): ICD-10-CM

## 2025-08-13 DIAGNOSIS — N18.31 STAGE 3A CHRONIC KIDNEY DISEASE (HCC): Chronic | ICD-10-CM

## 2025-08-13 DIAGNOSIS — I10 ESSENTIAL HYPERTENSION: ICD-10-CM

## 2025-08-13 DIAGNOSIS — E78.00 PURE HYPERCHOLESTEROLEMIA: Primary | ICD-10-CM

## 2025-08-13 DIAGNOSIS — R53.83 OTHER FATIGUE: ICD-10-CM

## 2025-08-13 DIAGNOSIS — R01.1 CARDIAC MURMUR: ICD-10-CM

## 2025-08-13 DIAGNOSIS — Z12.11 SCREENING FOR COLON CANCER: ICD-10-CM

## 2025-08-13 PROBLEM — E11.65 TYPE 2 DIABETES MELLITUS WITH HYPERGLYCEMIA, WITHOUT LONG-TERM CURRENT USE OF INSULIN (HCC): Chronic | Status: RESOLVED | Noted: 2024-08-19 | Resolved: 2025-08-13

## 2025-08-13 PROBLEM — R73.03 PREDIABETES: Status: RESOLVED | Noted: 2019-07-02 | Resolved: 2025-08-13

## 2025-08-13 PROCEDURE — 3044F HG A1C LEVEL LT 7.0%: CPT | Performed by: FAMILY MEDICINE

## 2025-08-13 PROCEDURE — 99214 OFFICE O/P EST MOD 30 MIN: CPT | Performed by: FAMILY MEDICINE

## 2025-08-13 PROCEDURE — 1159F MED LIST DOCD IN RCRD: CPT | Performed by: FAMILY MEDICINE

## 2025-08-13 PROCEDURE — 3008F BODY MASS INDEX DOCD: CPT | Performed by: FAMILY MEDICINE

## 2025-08-13 PROCEDURE — 3078F DIAST BP <80 MM HG: CPT | Performed by: FAMILY MEDICINE

## 2025-08-13 PROCEDURE — 3074F SYST BP LT 130 MM HG: CPT | Performed by: FAMILY MEDICINE

## 2025-08-13 RX ORDER — ROSUVASTATIN CALCIUM 10 MG/1
10 TABLET, COATED ORAL NIGHTLY
Qty: 90 TABLET | Refills: 1 | Status: SHIPPED | OUTPATIENT
Start: 2025-08-13

## (undated) DIAGNOSIS — E78.00 PURE HYPERCHOLESTEROLEMIA: ICD-10-CM

## (undated) DIAGNOSIS — R00.2 PALPITATIONS: ICD-10-CM

## (undated) DIAGNOSIS — I10 ESSENTIAL HYPERTENSION: ICD-10-CM

## (undated) NOTE — LETTER
08/09/19        [address] Git[address] [place] [address]      Dear Christel Brock,    Our records indicate that you have outstanding lab work and or testing that was ordered for you and has not yet been completed:  Orders Placed This Encounter

## (undated) NOTE — Clinical Note
03/23/2017        [address] [place] [address],[address]  [facility]      Dear Elis Uribe,    Our records indicate that you have outstanding lab work and or testing that was ordered for you and has not yet been completed:      VITAMIN D, 25-HYDROXY [id number]

## (undated) NOTE — LETTER
03/26/20        [address] [place] [address],[address]  [facility]      Dear Eliot Goel,    Our records indicate that you have outstanding lab work and or testing that was ordered for you and has not yet been completed:  Orders Placed This Encounter

## (undated) NOTE — LETTER
Date: 9/20/2023    Patient Name: Ashwin Rockwell          To Whom it may concern:    Ashwin Rockwell is under my care and has osteoarthritis in the knee. Her pain is aggravated with standing or walking for prolonged hours. This is a chronic condition. Please accommodate patient to sit and work for atleast 4 hours of her 6 hour work day to help prevent her pain from worsening. Please do not hesitate to contact my office with concerns.       Sincerely,    Daquan Zhao MD

## (undated) NOTE — LETTER
Date: 7/2/2019    Patient Name: Renetta Marinelli          To Whom it may concern:    José Ellis is under my care and has hypertension, patient is advised to avoid excessive stress and not to work more than 20 hours a week.     Please allow patient t

## (undated) NOTE — LETTER
Date: 7/2/2019    Patient Name: Bharathi Garrison          To Whom it may concern:    Thanh Liang is under my care and has hypertension, patient is advised to avoid excessive stress and not to work more than 30 hours a week.     Please allow patient t

## (undated) NOTE — LETTER
10/14/20        [address] [place] [address],[address]  [facility]      Dear Yimi Hernandez,    [facility] records indicate that you have outstanding lab work and or testing that was ordered for you and has not yet been completed:  Orders Placed This Encounter

## (undated) NOTE — LETTER
04/05/18        [address] [place] [address],[address]  [facility]      Dear Hanna Prior,    Our records indicate that you have outstanding lab work and or testing that was ordered for you and has not yet been completed:          BASIC METABOLIC PANEL [562

## (undated) NOTE — LETTER
Date: 11/22/2017    Patient Name: Hiram Gitelman          To Whom it may concern:    Rodney Amos is under my care and has hypertension, patient is advised to avoid excessive stress and not to work more than 30 hours a week.     Please allow patient

## (undated) NOTE — LETTER
01/06/20        [address] [place] [address],[address]  [facility]      Dear Basia Gibbons,    Our records indicate that you have outstanding lab work and or testing that was ordered for you and has not yet been completed:  Orders Placed This Encounter

## (undated) NOTE — LETTER
Date: 11/22/2017    Patient Name: Rodney Boyce          To Whom it may concern:    Иван Randolph is under my care and has hypertension, patient is advised to avoid excessive stress and not to work more than 30 hours a week.     Please do not hesita

## (undated) NOTE — MR AVS SNAPSHOT
[address] [address] [phone]               Thank you for choosing us for your health care visit with Janay Rojas MD.  We are glad to serve you and happy to provide you with this (nonsteroidal anti-inflammatory drugs). Home care  The following guidelines will help you care for yourself at home:  · If you have diabetes, talk with your healthcare provider about keeping your blood sugar under control.  Ask if you need to make and scott ¨ Certain stomach acid-blocking medicine such as cimetidine or ranitidine   ¨ Decongestants containing pseudoephedrine   ¨ Herbal supplements  Follow-up care  Follow up with your healthcare provider, or as advised.  Contact one of the following for more inf Take 81 mg by mouth daily. Losartan Potassium-HCTZ 100-12.5 MG Tabs   Take 1 tablet by mouth once daily. Commonly known as:  HYZAAR           metoprolol Tartrate 25 MG Tabs   Take 1 tablet (25 mg total) by mouth once daily.    Commonly known as: Support Staff. Remember, MyChart is NOT to be used for urgent needs. For medical emergencies, dial 911.            Visit [facility] online at  [email or web address]

## (undated) NOTE — LETTER
05/14/21        [address] [place] [address],[address]  [facility]      Dear Gonzalo Posada,    Our records indicate that you have outstanding lab work and or testing that was ordered for you and has not yet been completed:  Orders Placed This Encounter